# Patient Record
Sex: MALE | Race: WHITE | NOT HISPANIC OR LATINO | URBAN - METROPOLITAN AREA
[De-identification: names, ages, dates, MRNs, and addresses within clinical notes are randomized per-mention and may not be internally consistent; named-entity substitution may affect disease eponyms.]

---

## 2021-03-24 LAB
CHOLESTEROL (EXTERNAL): 173 MG/DL (ref 100–200)
GLUCOSE (EXTERNAL): 88 MG/DL (ref 70–100)
HDLC SERPL-MCNC: 40 MG/DL (ref 40–80)
LDL CHOLESTEROL (EXTERNAL): 100 MG/DL (ref 0–129)
POTASSIUM (EXTERNAL): 4.3 MEQ/L (ref 3.5–5.3)
TRIGLYCERIDES (EXTERNAL): 165 MG/DL (ref 50–150)
TSH SERPL-ACNC: 1.36 UIU/ML (ref 0.4–5)

## 2021-09-09 LAB
CREATININE (EXTERNAL): 1.02 MG/DL (ref 0.8–1.3)
GFR ESTIMATED (EXTERNAL): 75 ML/MIN/1.73M2
GFR ESTIMATED (IF AFRICAN AMERICAN) (EXTERNAL): >90 ML/MIN/1.73M2
HEP C HIM: NORMAL

## 2022-11-28 ENCOUNTER — MEDICAL CORRESPONDENCE (OUTPATIENT)
Dept: HEALTH INFORMATION MANAGEMENT | Facility: CLINIC | Age: 57
End: 2022-11-28

## 2022-11-28 ENCOUNTER — TELEPHONE (OUTPATIENT)
Dept: OTOLARYNGOLOGY | Facility: CLINIC | Age: 57
End: 2022-11-28

## 2022-11-28 NOTE — TELEPHONE ENCOUNTER
Records Requested   November 28, 2022 2:52 PM   Bruna    [facility]    Outcome Called Hachita to push image to [facility] PACS  11/2/22- CT Fusion Sinus   11/1/19- CT Fusion Sinus     November 28, 2022 4:07 PM - Received image in pacs and attached it to patient- Bruna

## 2022-11-29 ENCOUNTER — TRANSCRIBE ORDERS (OUTPATIENT)
Dept: OTHER | Age: 57
End: 2022-11-29

## 2022-11-29 DIAGNOSIS — J32.4 CHRONIC PANSINUSITIS: Primary | ICD-10-CM

## 2022-11-29 DIAGNOSIS — J33.9 NASAL POLYPOSIS: ICD-10-CM

## 2022-11-30 ENCOUNTER — TELEPHONE (OUTPATIENT)
Dept: OTOLARYNGOLOGY | Facility: CLINIC | Age: 57
End: 2022-11-30

## 2022-11-30 NOTE — TELEPHONE ENCOUNTER
[facility] Call Center    Phone Message    May a detailed message be left on voicemail: no     Reason for Call: Other: pt calling to schedule visit with dr salcedo and at the same time schedule surgery so he can do in one trip if possible please call pt to discuss did not want to schedule appt without diiscussing surgery option also. Pt stated he had talked to dr briseno    Action Taken: Other: routing to [facility] csc    Travel Screening: Not Applicable

## 2022-12-01 DIAGNOSIS — J32.4 CHRONIC PANSINUSITIS: Primary | ICD-10-CM

## 2022-12-01 NOTE — TELEPHONE ENCOUNTER
FUTURE VISIT INFORMATION      FUTURE VISIT INFORMATION:    Date: 1/11/23    Time: 3pm    Location: [facility] [place]  REFERRAL INFORMATION:    Referring provider:  Antonia AHN     Referring providers clinic:      Reason for visit/diagnosis  pt favors intervention such as: modified Lothrop to maximize access for topical therapies up into his frontal sinus, Chronic pansinusitis [J32.4] Nasal polyposis [J33.9]  Referred by: Antonia AHN @ , recs faxed - sched per pt    RECORDS REQUESTED FROM:       Clinic name Comments Records Status Imaging Status    11/2/22- CT Sinus   11/1/19- CT Sinus      11/28/22- referral from Antonia AHN   11/9/22 through 7/23/18 note from Darnell Alexander MD Scanned in [facility] & care everywhere

## 2022-12-02 ENCOUNTER — TELEPHONE (OUTPATIENT)
Dept: OTOLARYNGOLOGY | Facility: CLINIC | Age: 57
End: 2022-12-02

## 2022-12-02 NOTE — TELEPHONE ENCOUNTER
Writer called and LM for patient. Need to discuss POC and surgery with patient.     Left direct call back number.     Lillian Bond RN on 12/2/2022 at 1:17 PM

## 2022-12-02 NOTE — TELEPHONE ENCOUNTER
Called patient to schedule surgery with Dr. Waters    Date of Surgery: 1/17    Location of surgery: [facility]    Pre-Op H&P: PCP Darnell Scott    Pre/Post Imaging:  Not Applicable    Discussed COVID-19 Testing: Not Applicable    Post-Op Appt Date: 1 week    Surgery Packet Mailed: 12/2      Additional comments: [facility] on 12/2/2022 at 11:07 AM

## 2022-12-23 ENCOUNTER — TRANSFERRED RECORDS (OUTPATIENT)
Dept: MULTI SPECIALTY CLINIC | Facility: CLINIC | Age: 57
End: 2022-12-23

## 2022-12-23 LAB
ALT SERPL-CCNC: 32 U/L (ref 0–55)
AST SERPL-CCNC: 27 U/L (ref 5–45)

## 2023-01-11 ENCOUNTER — PRE VISIT (OUTPATIENT)
Dept: OTOLARYNGOLOGY | Facility: CLINIC | Age: 58
End: 2023-01-11

## 2023-01-13 PROBLEM — D49.4 BLADDER TUMOR: Status: ACTIVE | Noted: 2019-11-06

## 2023-01-13 RX ORDER — RABEPRAZOLE SODIUM 20 MG/1
20 TABLET, DELAYED RELEASE ORAL
COMMUNITY

## 2023-01-13 RX ORDER — FLUTICASONE FUROATE, UMECLIDINIUM BROMIDE AND VILANTEROL TRIFENATATE 200; 62.5; 25 UG/1; UG/1; UG/1
1 POWDER RESPIRATORY (INHALATION)
COMMUNITY
Start: 2022-08-03 | End: 2023-08-08

## 2023-01-13 RX ORDER — DUTASTERIDE 0.5 MG/1
0.5 CAPSULE, LIQUID FILLED ORAL
COMMUNITY
Start: 2022-09-09

## 2023-01-13 RX ORDER — BUPRENORPHINE HYDROCHLORIDE AND NALOXONE HYDROCHLORIDE DIHYDRATE 2; .5 MG/1; MG/1
TABLET SUBLINGUAL
COMMUNITY
Start: 2022-09-12

## 2023-01-13 RX ORDER — BUSPIRONE HYDROCHLORIDE 10 MG/1
10 TABLET ORAL
COMMUNITY
Start: 2022-06-27

## 2023-01-13 RX ORDER — OMALIZUMAB 150 MG/ML
300 INJECTION, SOLUTION SUBCUTANEOUS
COMMUNITY
Start: 2022-02-25

## 2023-01-13 RX ORDER — ALBUTEROL SULFATE 90 UG/1
2 AEROSOL, METERED RESPIRATORY (INHALATION)
COMMUNITY
Start: 2022-06-13

## 2023-01-16 ENCOUNTER — OFFICE VISIT (OUTPATIENT)
Dept: OTOLARYNGOLOGY | Facility: CLINIC | Age: 58
End: 2023-01-16
Payer: COMMERCIAL

## 2023-01-16 ENCOUNTER — ANESTHESIA EVENT (OUTPATIENT)
Dept: SURGERY | Facility: CLINIC | Age: 58
End: 2023-01-16
Payer: COMMERCIAL

## 2023-01-16 VITALS
DIASTOLIC BLOOD PRESSURE: 74 MMHG | HEART RATE: 98 BPM | BODY MASS INDEX: 30.67 KG/M2 | TEMPERATURE: 97.5 F | SYSTOLIC BLOOD PRESSURE: 124 MMHG | WEIGHT: 214.2 LBS | OXYGEN SATURATION: 98 % | HEIGHT: 70 IN

## 2023-01-16 DIAGNOSIS — J33.9 NASAL POLYPOSIS: ICD-10-CM

## 2023-01-16 DIAGNOSIS — L40.50 PSORIATIC ARTHRITIS (H): ICD-10-CM

## 2023-01-16 DIAGNOSIS — J32.4 CHRONIC PANSINUSITIS: ICD-10-CM

## 2023-01-16 DIAGNOSIS — J45.40 MODERATE PERSISTENT REACTIVE AIRWAY DISEASE WITHOUT COMPLICATION: Primary | ICD-10-CM

## 2023-01-16 PROCEDURE — 31231 NASAL ENDOSCOPY DX: CPT | Performed by: OTOLARYNGOLOGY

## 2023-01-16 PROCEDURE — 99204 OFFICE O/P NEW MOD 45 MIN: CPT | Mod: 25 | Performed by: OTOLARYNGOLOGY

## 2023-01-16 RX ORDER — BUDESONIDE 0.5 MG/2ML
INHALANT ORAL
Qty: 2 ML | Refills: 4 | Status: SHIPPED | OUTPATIENT
Start: 2023-01-16

## 2023-01-16 ASSESSMENT — PAIN SCALES - GENERAL: PAINLEVEL: MODERATE PAIN (4)

## 2023-01-16 NOTE — LETTER
"1/16/2023       RE: Elpidio De La Fuente  [address] [address]     Dear Colleague,    Thank you for referring your patient, Elpidio De La Fuente, to the [facility] [place] [facility]. Please see a copy of my visit note below.                       [facility]                   New Patient Visit      Encounter date: January 16, 2023    Referring Provider: Darnell Lincoln MD    Chief Complaint: chronic sinusitis with nasal polyps; surgical consult    History of Present Illness: Elpidio De La Fuente is a pleasant 57-year-old gentleman who I am seeing at the request of Dr. Darnell Lincoln for chronic sinusitis with nasal polyps.  He has a history of multiple endoscopic sinus surgeries for chronic sinus disease.  He does well after surgery but slowly over 2 to 3 years his symptoms recur.  His symptoms primarily consist of mental fogginess, nasal congestion, frontal pressure, thick nasal discharge.  He also has hyposmia, which improves with high-dose prednisone.  He was referred to me for consideration for revision surgery, including endoscopic modified Lothrop.    Sino-Nasal Outcome Test (SNOT - 22)  [facility]      Review of systems: A 14-point review of systems has been conducted and was negative for any notable symptoms, except as dictated in the history of present illness.     Past Medical History:   Diagnosis Date     Anxiety      History of kidney cancer      Sleep apnea      Uncomplicated asthma       -psoriatic arthritis    No past surgical history on file.     No family history on file.     Social History     Socioeconomic History     Marital status: Single   Substance and Sexual Activity     Alcohol use: Not Currently     Drug use: Never        Physical Exam:  Vital signs: /74 (BP Location: Left arm, Patient Position: Sitting, Cuff Size: Adult Large)   Pulse 98   Temp 97.5  F (36.4  C) (Temporal)   Ht 1.778 m (5' 10\")  "  Wt 97.2 kg (214 lb 3.2 oz)   SpO2 98%   BMI 30.73 kg/m     General Appearance: No acute distress, appropriate demeanor, conversant  Eyes: moist conjunctivae; EOMI; pupils symmetric; visual acuity grossly intact; no proptosis  Head: normocephalic; overall symmetric appearance without deformity  Face: overall symmetric without deformity; HB I/VI  Ears: Normal appearance of external ear;   Nose: No external deformity; see nasal endoscopy  Oral Cavity/oropharynx: Normal appearance of mucosa; tongue midline; no mass or lesions; oropharynx without obvious mucosal abnormality  Neck: no palpable lymphadenopathy; thyroid without palpable nodules  Lungs: symmetric chest rise; no wheezing  CV: Good distal perfusion; normal heart rate  Extremities: No deformity  Neurologic Exam: Cranial nerves II-XII are grossly intact; no focal deficit      Procedure Note  Procedure performed: Rigid nasal endoscopy  Indication: To evaluate for sinonasal pathology not visualized on routine anterior rhinoscopy  Anesthesia: 4% topical lidocaine with 0.05% oxymetazoline  Description of procedure: A 30 degree, 3 mm rigid endoscope was inserted into bilateral nasal cavities and the nasal valve, nasal cavity, middle meatus, sphenoethmoid recess, and nasopharynx were thoroughly evaluated for evidence of obstruction, edema, purulence, polyps and/or mass/lesion.     Lando-Luis Endoscopic Scoring System  Endoscopic observation Right Left   Polyps in middle meatus (0 = absent, 1 = restricted to middle meatus, 2 = Beyond middle meatus) 2 2   Discharge (0 = absent, 1 = thin and clear, 2 = thick, purulent) 1 1   Edema (0 = absent, 1 = mild-moderate, 2 = moderate-severe) 1 1   Crusting (0 = absent, 1 = mild-moderate, 2 = moderate-severe) 0 0   Scarring (0= absent, 1 = mild-moderate, 2 = moderate-severe) 0 0   Total 4 4     Findings  RT: Extensive postsurgical changes; evidence of modified endoscopic medial maxillectomy; polyp extending into the nasal  cavity from the olfactory cleft  LT: Extensive postsurgical changes; grade 2 nasal polyps; inferior meatal window present; polypoid edema and thickened mucin of the left maxillary sinus    Nasopharynx relatively clear    The patient tolerated the procedure well without complication.     Laboratory Review:  n/a    Imaging Review:  I personally reviewed images from the CT sinus from November 2, 2022:  There is pansinus mucosal thickening, most severe in the bilateral frontal sinuses. There are extensive new osteogenesis of many of the sinuses, especially the sphenoid sinus.  There are extensive surgical changes, including: Near complete resection of the right middle turbinate flush with the skull base, right endoscopic medial maxillectomy changes, left inferior meatal window, left partial resection of middle turbinate, septal perforation with button in place, extensive new osteogenesis of the high nasofrontal beak and high bony nasal septum.    Pathology Review:  n/a    Assessment/Medical Decision Making/Plan:  CRSwNP with comorbid asthma  History of psoriatic arthritis  Hyposmia and nasal congestion secondary to CRSwNP  Atypical facial pain    I reviewed CT imaging and endoscopy with Elpidio at length.  He has severe chronic sinusitis with nasal polyps and associated symptoms despite many trials of appropriate medical therapy.  We discussed options for revision surgery, including modified Lothrop versus conventional revision surgery.  We discussed merits, risks, benefits associated with both approaches.  We discussed risks associated with endoscopic modified Lothrop, primarily anosmia, scarring and also need for frequent postoperative debridements.  We also discussed conventional revision sinus surgery with plans to switch from Xolair to Dupixent, and he seemed very amenable to this.  Together, we decided that the most beneficial path would be to perform a conventional revision sinus surgery and then proceed with  switching to Dupixent.  He has already had discussed switching to Dupixent with both his rheumatologist who prescribes his Humira and his pulmonologist, who prescribes his Xolair.  Both appear to be on board with this and I would favor this approach as well.      Plan to move forward with full revision sinus surgery tomorrow.  We discussed postoperative expectations, which she is fairly familiar with.  I will plan to see him tomorrow morning.      Roman Waters MD    [facility]  Center for Skull Base and Pituitary Surgery  [facility]  Department of Otolaryngology - Head & Neck Surgery          Again, thank you for allowing me to participate in the care of your patient.      Sincerely,    Roman Waters MD

## 2023-01-16 NOTE — LETTER
Date:January 17, 2023      Patient was self referred, no letter generated. Do not send.        [facility] Health Information

## 2023-01-16 NOTE — PROGRESS NOTES
"                   [facility]                   New Patient Visit      Encounter date: January 16, 2023    Referring Provider: Darnell Lincoln MD    Chief Complaint: chronic sinusitis with nasal polyps; surgical consult    History of Present Illness: Elpidio De La Fuente is a pleasant 57-year-old gentleman who I am seeing at the request of Dr. Darnell Lincoln for chronic sinusitis with nasal polyps.  He has a history of multiple endoscopic sinus surgeries for chronic sinus disease.  He does well after surgery but slowly over 2 to 3 years his symptoms recur.  His symptoms primarily consist of mental fogginess, nasal congestion, frontal pressure, thick nasal discharge.  He also has hyposmia, which improves with high-dose prednisone.  He was referred to me for consideration for revision surgery, including endoscopic modified Lothrop.    Sino-Nasal Outcome Test (SNOT - 22)  [facility]      Review of systems: A 14-point review of systems has been conducted and was negative for any notable symptoms, except as dictated in the history of present illness.     Past Medical History:   Diagnosis Date     Anxiety      History of kidney cancer      Sleep apnea      Uncomplicated asthma       -psoriatic arthritis    No past surgical history on file.     No family history on file.     Social History     Socioeconomic History     Marital status: Single   Substance and Sexual Activity     Alcohol use: Not Currently     Drug use: Never        Physical Exam:  Vital signs: /74 (BP Location: Left arm, Patient Position: Sitting, Cuff Size: Adult Large)   Pulse 98   Temp 97.5  F (36.4  C) (Temporal)   Ht 1.778 m (5' 10\")   Wt 97.2 kg (214 lb 3.2 oz)   SpO2 98%   BMI 30.73 kg/m     General Appearance: No acute distress, appropriate demeanor, conversant  Eyes: moist conjunctivae; EOMI; pupils symmetric; visual acuity grossly intact; no proptosis  Head: normocephalic; overall symmetric appearance without deformity  Face: overall " symmetric without deformity; HB I/VI  Ears: Normal appearance of external ear;   Nose: No external deformity; see nasal endoscopy  Oral Cavity/oropharynx: Normal appearance of mucosa; tongue midline; no mass or lesions; oropharynx without obvious mucosal abnormality  Neck: no palpable lymphadenopathy; thyroid without palpable nodules  Lungs: symmetric chest rise; no wheezing  CV: Good distal perfusion; normal heart rate  Extremities: No deformity  Neurologic Exam: Cranial nerves II-XII are grossly intact; no focal deficit      Procedure Note  Procedure performed: Rigid nasal endoscopy  Indication: To evaluate for sinonasal pathology not visualized on routine anterior rhinoscopy  Anesthesia: 4% topical lidocaine with 0.05% oxymetazoline  Description of procedure: A 30 degree, 3 mm rigid endoscope was inserted into bilateral nasal cavities and the nasal valve, nasal cavity, middle meatus, sphenoethmoid recess, and nasopharynx were thoroughly evaluated for evidence of obstruction, edema, purulence, polyps and/or mass/lesion.     Aislinn-Luis Endoscopic Scoring System  Endoscopic observation Right Left   Polyps in middle meatus (0 = absent, 1 = restricted to middle meatus, 2 = Beyond middle meatus) 2 2   Discharge (0 = absent, 1 = thin and clear, 2 = thick, purulent) 1 1   Edema (0 = absent, 1 = mild-moderate, 2 = moderate-severe) 1 1   Crusting (0 = absent, 1 = mild-moderate, 2 = moderate-severe) 0 0   Scarring (0= absent, 1 = mild-moderate, 2 = moderate-severe) 0 0   Total 4 4     Findings  RT: Extensive postsurgical changes; evidence of modified endoscopic medial maxillectomy; polyp extending into the nasal cavity from the olfactory cleft  LT: Extensive postsurgical changes; grade 2 nasal polyps; inferior meatal window present; polypoid edema and thickened mucin of the left maxillary sinus    Nasopharynx relatively clear    The patient tolerated the procedure well without complication.     Laboratory  Review:  n/a    Imaging Review:  I personally reviewed images from the CT sinus from November 2, 2022:  There is pansinus mucosal thickening, most severe in the bilateral frontal sinuses. There are extensive new osteogenesis of many of the sinuses, especially the sphenoid sinus.  There are extensive surgical changes, including: Near complete resection of the right middle turbinate flush with the skull base, right endoscopic medial maxillectomy changes, left inferior meatal window, left partial resection of middle turbinate, septal perforation with button in place, extensive new osteogenesis of the high nasofrontal beak and high bony nasal septum.    Pathology Review:  n/a    Assessment/Medical Decision Making/Plan:  CRSwNP with comorbid asthma  History of psoriatic arthritis  Hyposmia and nasal congestion secondary to CRSwNP  Atypical facial pain    I reviewed CT imaging and endoscopy with Elpidio at length.  He has severe chronic sinusitis with nasal polyps and associated symptoms despite many trials of appropriate medical therapy.  We discussed options for revision surgery, including modified Lothrop versus conventional revision surgery.  We discussed merits, risks, benefits associated with both approaches.  We discussed risks associated with endoscopic modified Lothrop, primarily anosmia, scarring and also need for frequent postoperative debridements.  We also discussed conventional revision sinus surgery with plans to switch from Xolair to Dupixent, and he seemed very amenable to this.  Together, we decided that the most beneficial path would be to perform a conventional revision sinus surgery and then proceed with switching to Dupixent.  He has already had discussed switching to Dupixent with both his rheumatologist who prescribes his Humira and his pulmonologist, who prescribes his Xolair.  Both appear to be on board with this and I would favor this approach as well.      Plan to move forward with full revision  sinus surgery tomorrow.  We discussed postoperative expectations, which she is fairly familiar with.  I will plan to see him tomorrow morning.      Romna Waters MD    [facility]  Center for Skull Base and Pituitary Surgery  [facility]  Department of Otolaryngology - Head & Neck Surgery

## 2023-01-17 ENCOUNTER — ANESTHESIA (OUTPATIENT)
Dept: SURGERY | Facility: CLINIC | Age: 58
End: 2023-01-17
Payer: COMMERCIAL

## 2023-01-17 ENCOUNTER — HOSPITAL ENCOUNTER (OUTPATIENT)
Facility: CLINIC | Age: 58
Discharge: HOME | End: 2023-01-17
Attending: OTOLARYNGOLOGY | Admitting: OTOLARYNGOLOGY
Payer: COMMERCIAL

## 2023-01-17 VITALS
RESPIRATION RATE: 14 BRPM | OXYGEN SATURATION: 92 % | SYSTOLIC BLOOD PRESSURE: 144 MMHG | DIASTOLIC BLOOD PRESSURE: 91 MMHG | WEIGHT: 214.29 LBS | BODY MASS INDEX: 30.68 KG/M2 | HEIGHT: 70 IN | HEART RATE: 86 BPM | TEMPERATURE: 98.5 F

## 2023-01-17 DIAGNOSIS — J33.9 SINUSITIS WITH NASAL POLYPS: Primary | ICD-10-CM

## 2023-01-17 DIAGNOSIS — J32.9 SINUSITIS WITH NASAL POLYPS: Primary | ICD-10-CM

## 2023-01-17 LAB — GLUCOSE BLDC GLUCOMTR-MCNC: 98 MG/DL (ref 70–99)

## 2023-01-17 PROCEDURE — 61782 SCAN PROC CRANIAL EXTRA: CPT | Mod: GC | Performed by: OTOLARYNGOLOGY

## 2023-01-17 PROCEDURE — 370N000017 HC ANESTHESIA TECHNICAL FEE, PER MIN: Performed by: OTOLARYNGOLOGY

## 2023-01-17 PROCEDURE — 250N000009 HC RX 250: Performed by: ANESTHESIOLOGY

## 2023-01-17 PROCEDURE — 360N000076 HC SURGERY LEVEL 3, PER MIN: Performed by: OTOLARYNGOLOGY

## 2023-01-17 PROCEDURE — 87070 CULTURE OTHR SPECIMN AEROBIC: CPT | Performed by: OTOLARYNGOLOGY

## 2023-01-17 PROCEDURE — 87075 CULTR BACTERIA EXCEPT BLOOD: CPT | Performed by: OTOLARYNGOLOGY

## 2023-01-17 PROCEDURE — 250N000025 HC SEVOFLURANE, PER MIN: Performed by: OTOLARYNGOLOGY

## 2023-01-17 PROCEDURE — 272N000001 HC OR GENERAL SUPPLY STERILE: Performed by: OTOLARYNGOLOGY

## 2023-01-17 PROCEDURE — 250N000013 HC RX MED GY IP 250 OP 250 PS 637: Performed by: STUDENT IN AN ORGANIZED HEALTH CARE EDUCATION/TRAINING PROGRAM

## 2023-01-17 PROCEDURE — 710N000010 HC RECOVERY PHASE 1, LEVEL 2, PER MIN: Performed by: OTOLARYNGOLOGY

## 2023-01-17 PROCEDURE — 258N000003 HC RX IP 258 OP 636: Performed by: ANESTHESIOLOGY

## 2023-01-17 PROCEDURE — 710N000012 HC RECOVERY PHASE 2, PER MINUTE: Performed by: OTOLARYNGOLOGY

## 2023-01-17 PROCEDURE — 31276 NSL/SINS NDSC FRNT TISS RMVL: CPT | Mod: 50 | Performed by: OTOLARYNGOLOGY

## 2023-01-17 PROCEDURE — 250N000011 HC RX IP 250 OP 636: Performed by: ANESTHESIOLOGY

## 2023-01-17 PROCEDURE — 88312 SPECIAL STAINS GROUP 1: CPT | Mod: 26 | Performed by: STUDENT IN AN ORGANIZED HEALTH CARE EDUCATION/TRAINING PROGRAM

## 2023-01-17 PROCEDURE — 88305 TISSUE EXAM BY PATHOLOGIST: CPT | Mod: 26 | Performed by: STUDENT IN AN ORGANIZED HEALTH CARE EDUCATION/TRAINING PROGRAM

## 2023-01-17 PROCEDURE — 30130 EXCISE INFERIOR TURBINATE: CPT | Mod: 50 | Performed by: OTOLARYNGOLOGY

## 2023-01-17 PROCEDURE — 82962 GLUCOSE BLOOD TEST: CPT

## 2023-01-17 PROCEDURE — 31257 NSL/SINS NDSC TOT W/SPHENDT: CPT | Mod: 50 | Performed by: OTOLARYNGOLOGY

## 2023-01-17 PROCEDURE — 87077 CULTURE AEROBIC IDENTIFY: CPT | Mod: 59 | Performed by: OTOLARYNGOLOGY

## 2023-01-17 PROCEDURE — 999N000141 HC STATISTIC PRE-PROCEDURE NURSING ASSESSMENT: Performed by: OTOLARYNGOLOGY

## 2023-01-17 PROCEDURE — 250N000009 HC RX 250: Performed by: OTOLARYNGOLOGY

## 2023-01-17 PROCEDURE — 88305 TISSUE EXAM BY PATHOLOGIST: CPT | Mod: TC | Performed by: OTOLARYNGOLOGY

## 2023-01-17 PROCEDURE — 87176 TISSUE HOMOGENIZATION CULTR: CPT | Performed by: OTOLARYNGOLOGY

## 2023-01-17 PROCEDURE — C2625 STENT, NON-COR, TEM W/DEL SY: HCPCS | Performed by: OTOLARYNGOLOGY

## 2023-01-17 PROCEDURE — 31267 ENDOSCOPY MAXILLARY SINUS: CPT | Mod: 50 | Performed by: OTOLARYNGOLOGY

## 2023-01-17 PROCEDURE — 250N000011 HC RX IP 250 OP 636: Performed by: OTOLARYNGOLOGY

## 2023-01-17 DEVICE — IMP SINUS PROPEL MINI MOMETASONE FUORATE 370MCCG 16MM 60011: Type: IMPLANTABLE DEVICE | Site: NOSE | Status: FUNCTIONAL

## 2023-01-17 RX ORDER — ECHINACEA PURPUREA EXTRACT 125 MG
2 TABLET ORAL 3 TIMES DAILY
Qty: 100 ML | Refills: 0 | Status: SHIPPED | OUTPATIENT
Start: 2023-01-17

## 2023-01-17 RX ORDER — LIDOCAINE HYDROCHLORIDE AND EPINEPHRINE 10; 10 MG/ML; UG/ML
INJECTION, SOLUTION INFILTRATION; PERINEURAL PRN
Status: DISCONTINUED | OUTPATIENT
Start: 2023-01-17 | End: 2023-01-17 | Stop reason: HOSPADM

## 2023-01-17 RX ORDER — FENTANYL CITRATE 50 UG/ML
INJECTION, SOLUTION INTRAMUSCULAR; INTRAVENOUS PRN
Status: DISCONTINUED | OUTPATIENT
Start: 2023-01-17 | End: 2023-01-17

## 2023-01-17 RX ORDER — ONDANSETRON 2 MG/ML
4 INJECTION INTRAMUSCULAR; INTRAVENOUS EVERY 30 MIN PRN
Status: DISCONTINUED | OUTPATIENT
Start: 2023-01-17 | End: 2023-01-17 | Stop reason: HOSPADM

## 2023-01-17 RX ORDER — DEXAMETHASONE SODIUM PHOSPHATE 4 MG/ML
10 INJECTION, SOLUTION INTRA-ARTICULAR; INTRALESIONAL; INTRAMUSCULAR; INTRAVENOUS; SOFT TISSUE ONCE
Status: DISCONTINUED | OUTPATIENT
Start: 2023-01-17 | End: 2023-01-17 | Stop reason: HOSPADM

## 2023-01-17 RX ORDER — LABETALOL HYDROCHLORIDE 5 MG/ML
10 INJECTION, SOLUTION INTRAVENOUS
Status: DISCONTINUED | OUTPATIENT
Start: 2023-01-17 | End: 2023-01-17 | Stop reason: HOSPADM

## 2023-01-17 RX ORDER — ACETAMINOPHEN 325 MG/1
975 TABLET ORAL ONCE
Status: COMPLETED | OUTPATIENT
Start: 2023-01-17 | End: 2023-01-17

## 2023-01-17 RX ORDER — ONDANSETRON 4 MG/1
4 TABLET, ORALLY DISINTEGRATING ORAL EVERY 30 MIN PRN
Status: DISCONTINUED | OUTPATIENT
Start: 2023-01-17 | End: 2023-01-17 | Stop reason: HOSPADM

## 2023-01-17 RX ORDER — KETAMINE HYDROCHLORIDE 10 MG/ML
INJECTION INTRAMUSCULAR; INTRAVENOUS PRN
Status: DISCONTINUED | OUTPATIENT
Start: 2023-01-17 | End: 2023-01-17

## 2023-01-17 RX ORDER — LABETALOL 20 MG/4 ML (5 MG/ML) INTRAVENOUS SYRINGE
PRN
Status: DISCONTINUED | OUTPATIENT
Start: 2023-01-17 | End: 2023-01-17

## 2023-01-17 RX ORDER — FENTANYL CITRATE 50 UG/ML
25 INJECTION, SOLUTION INTRAMUSCULAR; INTRAVENOUS EVERY 5 MIN PRN
Status: DISCONTINUED | OUTPATIENT
Start: 2023-01-17 | End: 2023-01-17 | Stop reason: HOSPADM

## 2023-01-17 RX ORDER — HYDRALAZINE HYDROCHLORIDE 20 MG/ML
2.5-5 INJECTION INTRAMUSCULAR; INTRAVENOUS EVERY 10 MIN PRN
Status: DISCONTINUED | OUTPATIENT
Start: 2023-01-17 | End: 2023-01-17 | Stop reason: HOSPADM

## 2023-01-17 RX ORDER — EPINEPHRINE 1 MG/ML
INJECTION INTRAMUSCULAR; INTRAVENOUS; SUBCUTANEOUS PRN
Status: DISCONTINUED | OUTPATIENT
Start: 2023-01-17 | End: 2023-01-17 | Stop reason: HOSPADM

## 2023-01-17 RX ORDER — PROPOFOL 10 MG/ML
INJECTION, EMULSION INTRAVENOUS PRN
Status: DISCONTINUED | OUTPATIENT
Start: 2023-01-17 | End: 2023-01-17

## 2023-01-17 RX ORDER — FENTANYL CITRATE 50 UG/ML
50 INJECTION, SOLUTION INTRAMUSCULAR; INTRAVENOUS EVERY 5 MIN PRN
Status: DISCONTINUED | OUTPATIENT
Start: 2023-01-17 | End: 2023-01-17 | Stop reason: HOSPADM

## 2023-01-17 RX ORDER — SODIUM CHLORIDE, SODIUM LACTATE, POTASSIUM CHLORIDE, CALCIUM CHLORIDE 600; 310; 30; 20 MG/100ML; MG/100ML; MG/100ML; MG/100ML
INJECTION, SOLUTION INTRAVENOUS CONTINUOUS
Status: DISCONTINUED | OUTPATIENT
Start: 2023-01-17 | End: 2023-01-17 | Stop reason: HOSPADM

## 2023-01-17 RX ORDER — DEXAMETHASONE SODIUM PHOSPHATE 4 MG/ML
INJECTION, SOLUTION INTRA-ARTICULAR; INTRALESIONAL; INTRAMUSCULAR; INTRAVENOUS; SOFT TISSUE PRN
Status: DISCONTINUED | OUTPATIENT
Start: 2023-01-17 | End: 2023-01-17

## 2023-01-17 RX ORDER — PREDNISONE 20 MG/1
TABLET ORAL
Qty: 19 TABLET | Refills: 0 | Status: SHIPPED | OUTPATIENT
Start: 2023-01-17 | End: 2023-01-31

## 2023-01-17 RX ORDER — CEFAZOLIN SODIUM/WATER 2 G/20 ML
2 SYRINGE (ML) INTRAVENOUS SEE ADMIN INSTRUCTIONS
Status: DISCONTINUED | OUTPATIENT
Start: 2023-01-17 | End: 2023-01-17 | Stop reason: HOSPADM

## 2023-01-17 RX ORDER — GLYCOPYRROLATE 0.2 MG/ML
INJECTION, SOLUTION INTRAMUSCULAR; INTRAVENOUS PRN
Status: DISCONTINUED | OUTPATIENT
Start: 2023-01-17 | End: 2023-01-17

## 2023-01-17 RX ORDER — HYDROMORPHONE HYDROCHLORIDE 4 MG/ML
INJECTION, SOLUTION INTRAMUSCULAR; INTRAVENOUS; SUBCUTANEOUS PRN
Status: DISCONTINUED | OUTPATIENT
Start: 2023-01-17 | End: 2023-01-17

## 2023-01-17 RX ORDER — SODIUM CHLORIDE, SODIUM LACTATE, POTASSIUM CHLORIDE, CALCIUM CHLORIDE 600; 310; 30; 20 MG/100ML; MG/100ML; MG/100ML; MG/100ML
INJECTION, SOLUTION INTRAVENOUS CONTINUOUS PRN
Status: DISCONTINUED | OUTPATIENT
Start: 2023-01-17 | End: 2023-01-17

## 2023-01-17 RX ORDER — OXYCODONE HYDROCHLORIDE 5 MG/1
5 TABLET ORAL EVERY 6 HOURS PRN
Qty: 15 TABLET | Refills: 0 | Status: SHIPPED | OUTPATIENT
Start: 2023-01-17 | End: 2023-01-24

## 2023-01-17 RX ORDER — ONDANSETRON 8 MG/1
8 TABLET, ORALLY DISINTEGRATING ORAL EVERY 8 HOURS PRN
Qty: 12 TABLET | Refills: 0 | Status: SHIPPED | OUTPATIENT
Start: 2023-01-17

## 2023-01-17 RX ORDER — CEFAZOLIN SODIUM/WATER 2 G/20 ML
2 SYRINGE (ML) INTRAVENOUS
Status: COMPLETED | OUTPATIENT
Start: 2023-01-17 | End: 2023-01-17

## 2023-01-17 RX ORDER — ONDANSETRON 2 MG/ML
INJECTION INTRAMUSCULAR; INTRAVENOUS PRN
Status: DISCONTINUED | OUTPATIENT
Start: 2023-01-17 | End: 2023-01-17

## 2023-01-17 RX ORDER — LIDOCAINE HYDROCHLORIDE 20 MG/ML
INJECTION, SOLUTION INFILTRATION; PERINEURAL PRN
Status: DISCONTINUED | OUTPATIENT
Start: 2023-01-17 | End: 2023-01-17

## 2023-01-17 RX ADMIN — Medication 25 MG: at 08:45

## 2023-01-17 RX ADMIN — SODIUM CHLORIDE, POTASSIUM CHLORIDE, SODIUM LACTATE AND CALCIUM CHLORIDE: 600; 310; 30; 20 INJECTION, SOLUTION INTRAVENOUS at 11:52

## 2023-01-17 RX ADMIN — PROPOFOL 40 MG: 10 INJECTION, EMULSION INTRAVENOUS at 11:51

## 2023-01-17 RX ADMIN — LIDOCAINE HYDROCHLORIDE 100 MG: 20 INJECTION, SOLUTION INFILTRATION; PERINEURAL at 08:13

## 2023-01-17 RX ADMIN — Medication 5 MG: at 11:18

## 2023-01-17 RX ADMIN — PROPOFOL 50 MG: 10 INJECTION, EMULSION INTRAVENOUS at 08:17

## 2023-01-17 RX ADMIN — ONDANSETRON 4 MG: 2 INJECTION INTRAMUSCULAR; INTRAVENOUS at 11:26

## 2023-01-17 RX ADMIN — SODIUM CHLORIDE, POTASSIUM CHLORIDE, SODIUM LACTATE AND CALCIUM CHLORIDE: 600; 310; 30; 20 INJECTION, SOLUTION INTRAVENOUS at 08:07

## 2023-01-17 RX ADMIN — Medication 10 MG: at 09:29

## 2023-01-17 RX ADMIN — Medication 10 MG: at 09:43

## 2023-01-17 RX ADMIN — FENTANYL CITRATE 100 MCG: 50 INJECTION, SOLUTION INTRAMUSCULAR; INTRAVENOUS at 08:13

## 2023-01-17 RX ADMIN — PROPOFOL 30 MG: 10 INJECTION, EMULSION INTRAVENOUS at 11:22

## 2023-01-17 RX ADMIN — PROPOFOL 150 MG: 10 INJECTION, EMULSION INTRAVENOUS at 08:13

## 2023-01-17 RX ADMIN — PROPOFOL 50 MG: 10 INJECTION, EMULSION INTRAVENOUS at 08:15

## 2023-01-17 RX ADMIN — LABETALOL 20 MG/4 ML (5 MG/ML) INTRAVENOUS SYRINGE 5 MG: at 09:49

## 2023-01-17 RX ADMIN — ACETAMINOPHEN 975 MG: 325 TABLET, FILM COATED ORAL at 13:01

## 2023-01-17 RX ADMIN — Medication 50 MG: at 08:14

## 2023-01-17 RX ADMIN — Medication 20 MG: at 09:27

## 2023-01-17 RX ADMIN — FENTANYL CITRATE 50 MCG: 50 INJECTION, SOLUTION INTRAMUSCULAR; INTRAVENOUS at 09:59

## 2023-01-17 RX ADMIN — GLYCOPYRROLATE 0.2 MG: 0.2 INJECTION, SOLUTION INTRAMUSCULAR; INTRAVENOUS at 08:56

## 2023-01-17 RX ADMIN — HYDROMORPHONE HYDROCHLORIDE 0.5 MG: 4 INJECTION, SOLUTION INTRAMUSCULAR; INTRAVENOUS; SUBCUTANEOUS at 09:39

## 2023-01-17 RX ADMIN — Medication 20 MG: at 08:45

## 2023-01-17 RX ADMIN — LABETALOL 20 MG/4 ML (5 MG/ML) INTRAVENOUS SYRINGE 5 MG: at 11:38

## 2023-01-17 RX ADMIN — DEXAMETHASONE SODIUM PHOSPHATE 10 MG: 4 INJECTION, SOLUTION INTRA-ARTICULAR; INTRALESIONAL; INTRAMUSCULAR; INTRAVENOUS; SOFT TISSUE at 08:21

## 2023-01-17 RX ADMIN — Medication 2 G: at 08:31

## 2023-01-17 RX ADMIN — SUGAMMADEX 200 MG: 100 INJECTION, SOLUTION INTRAVENOUS at 11:51

## 2023-01-17 RX ADMIN — FENTANYL CITRATE 50 MCG: 50 INJECTION, SOLUTION INTRAMUSCULAR; INTRAVENOUS at 11:18

## 2023-01-17 RX ADMIN — PROPOFOL 30 MG: 10 INJECTION, EMULSION INTRAVENOUS at 11:46

## 2023-01-17 ASSESSMENT — ACTIVITIES OF DAILY LIVING (ADL)
ADLS_ACUITY_SCORE: 20
ADLS_ACUITY_SCORE: 35

## 2023-01-17 NOTE — ANESTHESIA PROCEDURE NOTES
Airway       Patient location during procedure: OR       Procedure Start/Stop Times: 1/17/2023 8:18 AM  Staff -        Anesthesiologist:  Norman Moreno MD       CRNA: Sunny Jimenez APRN CRNA       Other Anesthesia Staff: Margarito Farfan       Performed By: SRNA  Consent for Airway        Urgency: elective  Indications and Patient Condition       Indications for airway management: david-procedural       Induction type:intravenous       Mask difficulty assessment: 1 - vent by mask    Final Airway Details       Final airway type: endotracheal airway       Successful airway: ETT - single  Endotracheal Airway Details        ETT size (mm): 7.5       Cuffed: yes       Successful intubation technique: direct laryngoscopy       Grade View of Cords: 1       Adjucts: stylet and bougie       Position: Right       Measured from: gums/teeth       Secured at (cm): 25       Bite block used: None    Post intubation assessment        Placement verified by: capnometry, equal breath sounds and chest rise        Number of attempts at approach: 2       Number of other approaches attempted: 1       Secured with: pink tape       Ease of procedure: easy       Dentition: Intact    Medication(s) Administered   Medication Administration Time: 1/17/2023 8:18 AM    Additional Comments       First attempt, grade 1 view of cords but 7.5 tube would not pass. Second attempt, bougie inserted into trachea and 7.5 tube easily passed into trachea.

## 2023-01-17 NOTE — DISCHARGE INSTRUCTIONS
Surgical Discharge Instructions  1. Start rinsing both nasal cavities with Guille-Med Sinus rinse twice daily. This will assist with healing after surgery. If you do not have sinus rinse equipment, you may use ocean nasal saline spray prescribed after surgery; however, sinus rinse is preferred.   2. Take medications as prescribed.    3. You have been prescribed three medications: a narcotic pain medication (oxycodone) to take as needed for pain not controlled with tylenol; a prednisone taper; and, a medication for nausea (ondansetron, aka zofran) to use for nausea and/or vomiting. Antibiotics may be prescribed to you at a later date based on cultures obtained at the time of surgery.   4. Do not drive or operate machinery while taking narcotic pain medication.   5. For bleeding that is bothersome or excessive, spray afrin (oxymetazoline) nasal spray (four sprays into each nostril) and pinch the tip of the nose closed for 10 minutes. If you find you have done this four times in one hour and are still having bothersome bleeding, please call your doctor.  Afrin has been sent home with you at the day of surgery. If there is blood on the tip of the nasal spray bottle, please don't be alarmed, as this is likely because this was used during or after surgery as you were in the recovery area.   6. Please call your doctor for any headache not controlled with pain medication, severe nausea or vomiting, fevers >100.4, changes in mental status, or any other concerning symptoms you may identify.     Roman Waters MD    [facility]  Center for Skull Base and Pituitary Surgery  [facility]  Department of Otolaryngology - Head & Neck Surgery    [facility]  Same-Day Surgery   Adult Discharge Orders & Instructions     For 24 hours after surgery    Get plenty of rest.  A responsible adult must stay with you for at least 24 hours after you leave the  hospital.   Do not drive or use heavy equipment.  If you have weakness or tingling, don't drive or use heavy equipment until this feeling goes away.  Do not drink alcohol.  Avoid strenuous or risky activities.  Ask for help when climbing stairs.   You may feel lightheaded.  IF so, sit for a few minutes before standing.  Have someone help you get up.   If you have nausea (feel sick to your stomach): Drink only clear liquids such as apple juice, ginger ale, broth or 7-Up.  Rest may also help.  Be sure to drink enough fluids.  Move to a regular diet as you feel able.  You may have a slight fever. Call the doctor if your fever is over 100 F (37.7 C) (taken under the tongue) or lasts longer than 24 hours.  You may have a dry mouth, a sore throat, muscle aches or trouble sleeping.  These should go away after 24 hours.  Do not make important or legal decisions.   Call your doctor for any of the followin.  Signs of infection (fever, growing tenderness at the surgery site, a large amount of drainage or bleeding, severe pain, foul-smelling drainage, redness, swelling).    2. It has been over 8 to 10 hours since surgery and you are still not able to urinate (pass water).    3.  Headache for over 24 hours.    4.  Numbness, tingling or weakness the day after surgery (if you had spinal anesthesia).  To contact a doctor, call Roman Waters MD or:    '   [phone] and ask for the resident on call for   ENT (ear, nose, and throat) (answered 24 hours a day)  '   Emergency Department:    [facility]: [phone]       (TTY for hearing impaired: [phone])    [facility]: [phone]       (TTY for hearing impaired: [phone])

## 2023-01-17 NOTE — BRIEF OP NOTE
[facility]    Brief Operative Note    Pre-operative diagnosis: Chronic pansinusitis [J32.4]  Post-operative diagnosis Same as pre-operative diagnosis    Procedure: Procedure(s):  BILATERAL REVISION ENDOSCOPIC FRONTAL SINUS EXPLORATION WITH TISSUE REMOVAL, TOTAL ETHMOIDECTOMY, MAXILLARY ANTROSTOMY WITH TISSUE REMOVAL, SPHENOIDOTOMY WITH TISSUE REMOVAL  Surgeon: Surgeon(s) and Role:     * Roman Waters MD - Primary  Anesthesia: General   Estimated Blood Loss: 200 ml    Drains: None  Specimens:   ID Type Source Tests Collected by Time Destination   1 : bilateral sinus contents Tissue Other SURGICAL PATHOLOGY EXAM Roman Waters MD 1/17/2023 11:13 AM    A : Right Maxillary Sinus Tissue Sinus, Maxillary, Right ANAEROBIC BACTERIAL CULTURE ROUTINE, AEROBIC BACTERIAL CULTURE ROUTINE Roman Waters MD 1/17/2023  8:59 AM      Findings:   None.  Complications: None.  Implants:   Implant Name Type Inv. Item Serial No.  Lot No. LRB No. Used Action   IMP SINUS PROPEL MINI MOMETASONE FUORATE 370MCCG 16MM [id number] - [id number] Other IMP SINUS PROPEL MINI MOMETASONE FUORATE 370MCCG 16MM [id number]  [employer] [id number] Left 1 Implanted   IMP SINUS PROPEL MINI MOMETASONE FUORATE 370MCCG 16MM [id number] - [id number] Other IMP SINUS PROPEL MINI MOMETASONE FUORATE 370MCCG 16MM [id number] [employer]  Right 1 Implanted     Arabella Dwo MD PGY4  ENT Resident

## 2023-01-17 NOTE — ANESTHESIA CARE TRANSFER NOTE
Patient: Elpidio De La Fuente    Procedure: Procedure(s):  BILATERAL REVISION ENDOSCOPIC FRONTAL SINUS EXPLORATION WITH TISSUE REMOVAL, TOTAL ETHMOIDECTOMY, MAXILLARY ANTROSTOMY WITH TISSUE REMOVAL, SPHENOIDOTOMY WITH TISSUE REMOVAL       Diagnosis: Chronic pansinusitis [J32.4]  Diagnosis Additional Information: No value filed.    Anesthesia Type:   General     Note:    Oropharynx: oral airway in place  Level of Consciousness: drowsy  Oxygen Supplementation: face mask  Level of Supplemental Oxygen (L/min / FiO2): 6  Independent Airway: airway patency satisfactory and stable  Dentition: dentition unchanged  Vital Signs Stable: post-procedure vital signs reviewed and stable    Patient transferred to: PACU    Handoff Report: Identifed the Patient, Identified the Reponsible Provider, Reviewed the pertinent medical history, Discussed the surgical course, Reviewed Intra-OP anesthesia mangement and issues during anesthesia, Set expectations for post-procedure period and Allowed opportunity for questions and acknowledgement of understanding      Vitals:  Vitals Value Taken Time   BP     Temp     Pulse     Resp     SpO2 95 % 01/17/23 1203   Vitals shown include unvalidated device data.    Electronically Signed By: JALYN Herrera CRNA  January 17, 2023  12:04 PM

## 2023-01-17 NOTE — ANESTHESIA PREPROCEDURE EVALUATION
Anesthesia Pre-Procedure Evaluation    Patient: Elpidio De La Fuente   MRN: [id number] : 1965        Procedure : Procedure(s):  BILATERAL REVISION ENDOSCOPIC FRONTAL SINUS EXPLORATION WITH TISSUE REMOVAL, TOTAL ETHMOIDECTOMY, MAXILLARY ANTROSTOMY WITH TISSUE REMOVAL, SPHENOIDOTOMY WITH TISSUE REMOVAL          Past Medical History:   Diagnosis Date     Anxiety      History of kidney cancer      Sleep apnea      Uncomplicated asthma       History reviewed. No pertinent surgical history.   Allergies   Allergen Reactions     Seasonal Allergies Other (See Comments)     Sinus HA     Sulfa Drugs Other (See Comments)     Sheikh Eugene      Social History     Tobacco Use     Smoking status: Not on file     Smokeless tobacco: Not on file   Substance Use Topics     Alcohol use: Not Currently      Wt Readings from Last 1 Encounters:   23 97.2 kg (214 lb 4.6 oz)        Anesthesia Evaluation            ROS/MED HX  ENT/Pulmonary:     (+) sleep apnea, asthma     Neurologic:  - neg neurologic ROS     Cardiovascular:  - neg cardiovascular ROS     METS/Exercise Tolerance:     Hematologic:  - neg hematologic  ROS     Musculoskeletal: Comment: Psoriatic arthritis  (+) arthritis,     GI/Hepatic:  - neg GI/hepatic ROS   (+) GERD,     Renal/Genitourinary:  - neg Renal ROS     Endo:     (+) Obesity,     Psychiatric/Substance Use:  - neg psychiatric ROS   (+) H/O chronic opiod use . : suboxone patch and PO.    Infectious Disease:  - neg infectious disease ROS     Malignancy:  - neg malignancy ROS     Other:               OUTSIDE LABS:  CBC: No results found for: WBC, HGB, HCT, PLT  BMP: No results found for: NA, POTASSIUM, CHLORIDE, CO2, BUN, CR, GLC  COAGS: No results found for: PTT, INR, FIBR  POC: No results found for: BGM, HCG, HCGS  HEPATIC: No results found for: ALBUMIN, PROTTOTAL, ALT, AST, GGT, ALKPHOS, BILITOTAL, BILIDIRECT, DAYSI  OTHER: No results found for: PH, LACT, A1C, JOSSELINE, PHOS, MAG, LIPASE, AMYLASE, TSH, T4, T3,  CRP, SED    Anesthesia Plan    ASA Status:  2   NPO Status:  NPO Appropriate    Anesthesia Type: General.   Induction: Intravenous.   Maintenance: Balanced.        Consents    Anesthesia Plan(s) and associated risks, benefits, and realistic alternatives discussed. Questions answered and patient/representative(s) expressed understanding.    - Discussed:     - Discussed with:  Patient         Postoperative Care       PONV prophylaxis: Ondansetron (or other 5HT-3), Dexamethasone or Solumedrol     Comments:    Other Comments: Continue PTA buprenorphine, ketamine, may require allison opioid requirement            Norman Moreno MD

## 2023-01-17 NOTE — ANESTHESIA POSTPROCEDURE EVALUATION
"Patient: Elpidio De La Fuente    Procedure: Procedure(s):  BILATERAL REVISION ENDOSCOPIC FRONTAL SINUS EXPLORATION WITH TISSUE REMOVAL, TOTAL ETHMOIDECTOMY, MAXILLARY ANTROSTOMY WITH TISSUE REMOVAL, SPHENOIDOTOMY WITH TISSUE REMOVAL       Anesthesia Type:  General    Note:  Disposition: Outpatient   Postop Pain Control: Uneventful            Sign Out: Well controlled pain   PONV: No   Neuro/Psych: Uneventful            Sign Out: Acceptable/Baseline neuro status   Airway/Respiratory: Uneventful            Sign Out: Acceptable/Baseline resp. status   CV/Hemodynamics: Uneventful            Sign Out: Acceptable CV status; No obvious hypovolemia; No obvious fluid overload   Other NRE: NONE   DID A NON-ROUTINE EVENT OCCUR? No    Event details/Postop Comments:  Pain rated 5/10 in sinuses, baseline pain (arthritic 4/10, 3/10 this AM). Reports pain is \"tolerable.\"           Last vitals:  Vitals Value Taken Time   /92 01/17/23 1230   Temp 37.3  C (99.1  F) 01/17/23 1201   Pulse 90 01/17/23 1245   Resp 18 01/17/23 1245   SpO2 94 % 01/17/23 1245   Vitals shown include unvalidated device data.    Electronically Signed By: Norman Moreno MD  January 17, 2023  12:46 PM  "

## 2023-01-19 NOTE — OP NOTE
Procedure Date: 01/17/2023    PREOPERATIVE DIAGNOSES:    1.  Chronic pansinusitis with nasal polyposis.  2.  Nasal obstruction.  3.  Atypical facial pain.  4.  Hyposmia.  5.  Reactive lower airway disease.    POSTOPERATIVE DIAGNOSES:  1.  Chronic pansinusitis with nasal polyposis.  2.  Nasal obstruction.  3.  Atypical facial pain.  4.  Hyposmia.  5.  Reactive lower airway disease.    PROCEDURE PERFORMED:     1.  Bilateral endoscopic frontal sinus exploration with tissue removal, total ethmoidectomy.  2.  Bilateral endoscopic sphenoidotomy with tissue removal.  3.  Bilateral endoscopic revision maxillary antrostomy with tissue removal.  4.  Bilateral endoscopic partial inferior turbinate resection.  5.  Computerized image guidance, extradural.    SURGEON:  Roman Waters MD    ASSISTANT:  Arabella Dow MD    INDICATIONS FOR PROCEDURE:  Elpidio De La Fuente is a pleasant 57-year-old gentleman who has a history of severe chronic sinusitis with nasal polyposis and multiple prior surgeries, as well as a history of asthma who had persistent ongoing symptoms related to chronic pansinusitis despite appropriate medical therapy and even biologic treatment with Xolair.  He was a candidate for revision surgery as listed above, and after discussion of the risks, benefits and alternatives of surgery at length, he was agreeable to proceed.    DESCRIPTION OF PROCEDURE IN DETAIL:  The patient was identified in the preoperative holding area.  Consent was confirmed.  He was subsequently brought back to the operating room where general anesthesia was induced and he was intubated without issue.  The eyes were protected.  Timeout was performed.  All were in agreement.  He was subsequently turned 180 degrees and prepped and draped in standard fashion in preparation for endoscopic sinonasal surgery.  We performed a contour-based image guidance registration using [employer] image guidance registration system.  Several landmarks across the  facial skeleton were used to perform good target registration with minimal error.  This was used throughout the case to confirm important landmarks, including the skull base, orbit, as well as perform frontal sinus dissection, which was especially important in this patient who had a severe burden of inflammation as well as extensive prior sinus surgery history.  We started the procedure by decongesting bilateral nasal cavities with 1:100,000 epinephrine-soaked pledgets.  We started the procedure on the left side.  We injected the residual polyp tissue within the nasal cavity as well as the lateral nasal wall.  We started the procedure on the left side. We injected the polyp that was extending into the nasal cavity as well as lateral nasal wall and residual inferior turbinate.  There was evidence of growth extending towards the nasal cavity floor as well as an inferior meatal window on the left maxillary sinus.  The polyp was debulked superiorly towards the olfactory cleft in which I identified the middle turbinate. We proceeded to perform revision maxillary antrostomy on the left side.  There was residual uncinate of the horizontal process of the uncinate as well as maxillary sinus bony overhang towards the posterior ethmoid cavity, which was removed using straight Thru-Cutting forceps as well as microdebrider.  There was polypoid tissue within the maxillary sinus that was removed using a curved shaver. We then performed a dolores-antrostomy on the left side.  The residual inferior turbinate was resected laterally towards the lateral nasal wall and suction Bovie electrocautery once removed. The medial maxillary sinus wall was removed using maxillary downbiting punch as well as backbiter and microdebrider.  There was additional polypoid tissue within the maxillary sinus, which was removed using a curved shaver after this was done. We then proceeded with performing revision total ethmoidectomy.  There was extensive polyp  disease of the ethmoid cavity, which was removed in both the anterior and posterior ethmoid cavity.  Residual ethmoid partitions were removed using upbiting forceps.  We then encountered the sphenoid sinus, which was widely opened, but there was residual sphenoid face inferiorly, which was removed using a straight Hosemann punch.  There was polypoid tissue within the sphenoid sinus, which was removed using a microdebrider.  We then continued our dissection anteriorly, removing additional polyp tissue from the ethmoid roof skull base using a curved shaver. We then switched to a 30-degree telescope and using a curved navigation probe, we identified the frontal recess.  There was polyp tissue extending from the frontal sinus into the frontal recess, which was removed using a curved shaver, this was debrided superiorly towards the frontal sinus.  There was polyp tissue, which was pedicled off of the lateral lamella of the skull base as well as cribriform plate.  This was shaved gently until we reached the lateral lamella of the cribriform plate.  We took special care not to cause undue injury to the skull base in this region.  The frontal sinus was cleared of all polyp tissue, which was removed using a curved shaver and once this was done, the frontal sinus was irrigated using warm saline irrigation.  Once we were happy with our dissection on the left side, we irrigated the sinonasal cavity.  Meticulous hemostasis was ensured.  We placed a steroid-eluting stent into the frontal ostium on the left side, and placed a NasoPore sponge into the middle meatus to medialize the middle turbinate.  We then turned our attention to the right side. We again injected the residual inferior turbinate against the lateral nasal wall towards the axilla as well as polyp tissue.  We then proceeded with dissection.  There was polyp tissue of the olfactory cleft in the middle meatus, which was shaved using a microdebrider.  We then turned our  attention to the maxillary sinus.  There was polyp tissue within the maxillary sinus, which was debrided using a microdebrider.  There was prior modified endoscopic medial maxillectomy, which had been performed.  This was revised and we removed the medial maxillary sinus wall using a backbiter and removed the bony ridge along the floor of the maxillary sinus using downbiting instrumentation.  The posterior bony maxillary sinus wall was removed using straight Thru-Cutting forceps.  We also performed a partial inferior turbinate resection by removing the residual posterior inferior turbinate stump using straight Thru-Cutting forceps and suction bovied the residual stump using the suction Bovie.  We then turned our attention and performed revision total ethmoidectomy.  There was polyp tissue of the anterior and posterior ethmoid cavity, which was shaved using a microdebrider.  Residual partitions of the anterior and posterior ethmoid cavity were removed using upbiting forceps as well as microdebrider.  Sphenoid sinus was identified and dilated using a [place] elevator.  It was opened widely using straight Hosemann punch, angled Thru-Cutting forceps, as well as microdebrider.  There was polyp tissue within the sphenoid sinus, which was removed gently using a microdebrider.  We continued our dissection anteriorly, removing additional partitions off the ethmoid roof skull base until we identified the frontal recess using a curved navigation probe.  There is polyp tissue extending into the frontal recess from the frontal sinus.  This was shaved using RAD 90 microdebrider. Using angled telescopic visualization, we removed polyp tissue from the frontal sinus using a microdebrider.  Once we were happy with our dissection, we irrigated the frontal sinus out and removed copious purulent secretions from the frontal sinus.  Once we were happy with our dissection, we placed a steroid-eluting stent into the frontal ostium on the  right side and we placed NasoPore sponge in the middle meatus after meticulous hemostasis was ensured.  An orogastric tube was placed in the stomach and the contents were suctioned.  We subsequently turned the patient over to Anesthesia for awakening.  He was extubated in uneventful fashion and transferred to PACU in stable condition.  I did call the daughter and updated her regarding the outcome of surgery.    Roman Waters MD        D: 2023   T: 2023   MT: ANKUSH    Name:     DONNY SLADEIsac  MRN:      [id number]        Account:        [id number]   :      1965           Procedure Date: 2023     Document: [id number]

## 2023-01-20 ENCOUNTER — MYC MEDICAL ADVICE (OUTPATIENT)
Dept: OTOLARYNGOLOGY | Facility: CLINIC | Age: 58
End: 2023-01-20
Payer: COMMERCIAL

## 2023-01-20 ENCOUNTER — TELEPHONE (OUTPATIENT)
Dept: OTOLARYNGOLOGY | Facility: CLINIC | Age: 58
End: 2023-01-20
Payer: COMMERCIAL

## 2023-01-20 DIAGNOSIS — A49.01 STAPH AUREUS INFECTION: Primary | ICD-10-CM

## 2023-01-20 LAB
BACTERIA SINUS CULT: ABNORMAL

## 2023-01-20 RX ORDER — CEPHALEXIN 500 MG/1
500 CAPSULE ORAL 4 TIMES DAILY
Qty: 40 CAPSULE | Refills: 0 | Status: SHIPPED | OUTPATIENT
Start: 2023-01-20 | End: 2023-01-30

## 2023-01-20 NOTE — TELEPHONE ENCOUNTER
Writer spoke with patient, confirmed he should follow-up with his rheumatologist regarding medication questions.     Also informed him of his culture results and antibiotic.     Responsible Attending Physician: Jeff Waters  Date of Discharge: 1/17/23    Discharge to:  Home     Current Status:  Pt is a 58 y/o male s/p BILATERAL REVISION ENDOSCOPIC FRONTAL SINUS EXPLORATION WITH TISSUE REMOVAL, TOTAL ETHMOIDECTOMY, MAXILLARY ANTROSTOMY WITH TISSUE REMOVAL, SPHENOIDOTOMY WITH TISSUE REMOVAL on 1/17/23.  Reports pre-op symptoms improved.   Operative  pain is decreasing.  Ambulating without assistance.  Pain well controlled with current meds, ample supply. Denies redness, swelling, increased tenderness, or elevated temp.  Denies current bowel or bladder issues.  No visible sutures/staples in place.        Discharge instructions and medication use were reviewed.  RN triage/on call number given:  [phone] or after hours and w/e - [phone].  Patient verbalized understanding and agreement with current plan.     Follow up appointments: 1/25/23

## 2023-01-20 NOTE — TELEPHONE ENCOUNTER
----- Message from Roman Waters MD sent at 1/20/2023 10:57 AM CST -----  Mushtaq Snyder,   Can we let Elpidio know about his culture results and prescribe 500mg keflex four times daily for 10 days?  Thanks,  Jeff

## 2023-01-23 LAB
PATH REPORT.COMMENTS IMP SPEC: NORMAL
PATH REPORT.COMMENTS IMP SPEC: NORMAL
PATH REPORT.FINAL DX SPEC: NORMAL
PATH REPORT.GROSS SPEC: NORMAL
PATH REPORT.MICROSCOPIC SPEC OTHER STN: NORMAL
PATH REPORT.MICROSCOPIC SPEC OTHER STN: NORMAL
PATH REPORT.RELEVANT HX SPEC: NORMAL
PHOTO IMAGE: NORMAL

## 2023-01-24 LAB — BACTERIA SINUS CULT: NORMAL

## 2023-01-25 ENCOUNTER — OFFICE VISIT (OUTPATIENT)
Dept: OTOLARYNGOLOGY | Facility: CLINIC | Age: 58
End: 2023-01-25
Payer: COMMERCIAL

## 2023-01-25 VITALS
WEIGHT: 217.4 LBS | BODY MASS INDEX: 31.12 KG/M2 | HEIGHT: 70 IN | TEMPERATURE: 97.2 F | SYSTOLIC BLOOD PRESSURE: 140 MMHG | DIASTOLIC BLOOD PRESSURE: 87 MMHG

## 2023-01-25 DIAGNOSIS — J32.4 CHRONIC PANSINUSITIS: ICD-10-CM

## 2023-01-25 DIAGNOSIS — J33.9 NASAL POLYPOSIS: Primary | ICD-10-CM

## 2023-01-25 PROCEDURE — 99207 PR CDG-PROCEDURE CHARGE ONLY: CPT | Performed by: OTOLARYNGOLOGY

## 2023-01-25 PROCEDURE — 31237 NSL/SINS NDSC SURG BX POLYPC: CPT | Mod: 50 | Performed by: OTOLARYNGOLOGY

## 2023-01-25 RX ORDER — DUPILUMAB 300 MG/2ML
300 INJECTION, SOLUTION SUBCUTANEOUS
Qty: 12 ML | Refills: 2 | Status: SHIPPED | OUTPATIENT
Start: 2023-01-25

## 2023-01-25 ASSESSMENT — PAIN SCALES - GENERAL: PAINLEVEL: NO PAIN (0)

## 2023-01-25 NOTE — PROGRESS NOTES
[facility]  Return Visit  Encounter date:   January 25, 2023    Chief Complaint:   First Post op    ID: Chronic sinusitis s/p sinonasal surgery as below on 1/17/23    Interval History:   Elpidio De La Fuente is a 57 year old male who presents for follow up s/p sinonasal surgery on 1/17/23. He has been doing well since surgery. He has been rinsing but yesterday noticed a decrease in the output. He has had some bleeding from the nose.     Sino-Nasal Outcome Test (SNOT - 22)  DNT    [facility]  Procedure Date: 01/17/2023     PREOPERATIVE DIAGNOSES:    1.  Chronic pansinusitis with nasal polyposis.  2.  Nasal obstruction.  3.  Atypical facial pain.  4.  Hyposmia.  5.  Reactive lower airway disease.     POSTOPERATIVE DIAGNOSES:  1.  Chronic pansinusitis with nasal polyposis.  2.  Nasal obstruction.  3.  Atypical facial pain.  4.  Hyposmia.  5.  Reactive lower airway disease.     PROCEDURE PERFORMED:     1.  Bilateral endoscopic frontal sinus exploration with tissue removal, total ethmoidectomy.  2.  Bilateral endoscopic sphenoidotomy with tissue removal.  3.  Bilateral endoscopic revision maxillary antrostomy with tissue removal.  4.  Bilateral endoscopic partial inferior turbinate resection.  5.  Computerized image guidance, extradural.     SURGEON:  Roman Waters MD     ASSISTANT:  Arabella Dow MD    Review of systems: A 14-point review of systems has been conducted and is negative for any notable symptoms, except as dictated in the history of present illness.     Physical Exam:  Vital signs: There were no vitals taken for this visit.   General Appearance: No acute distress, appropriate demeanor, conversant  Eyes: moist conjunctivae; EOMI; pupils symmetric; visual acuity grossly intact; no proptosis  Head: normocephalic; overall symmetric appearance without deformity  Face: overall symmetric without deformity; HB I/VI  Nose: No external deformity; see endoscopy  Lungs: symmetric chest  rise; no wheezing  CV: Good distal perfusion; normal hear rate  Extremities: No deformity  Neurologic Exam: Cranial nerves II-XII are grossly intact; no focal deficit     Procedure Note  Procedure performed: Rigid nasal endoscopy with bilateral debridement  Indication: To evaluate for sinonasal pathology not visualized on routine anterior rhinoscopy  Anesthesia: 4% topical lidocaine with 0.05 % oxymetazoline  Description of procedure: A 30 degree, 3 mm rigid endoscope was inserted into bilateral nasal cavities and the nasal valves, nasal cavity, middle meatus, sphenoethmoid recess, nasopharynx were evaluated for evidence of obstruction, edema, purulence, polyps and/or mass/lesion.     I then used a combination of non-cutting forceps, straight and curved suction to clear bilateral surgical cavities of packing, clot, crust, and fibrinopurulent debris.      Findings  Post surgical findings as expected  Massive crusting around maxillary sinuses and nasal cavities cleared  Propel stents removed bilat  All sinuses patent after debridement    The patient tolerated the procedure well without complication.     Laboratory Review:  n/a     Imaging Review:  I personally reviewed images from the CT sinus from November 2, 2022:  There is pansinus mucosal thickening, most severe in the bilateral frontal sinuses. There are extensive new osteogenesis of many of the sinuses, especially the sphenoid sinus.  There are extensive surgical changes, including: Near complete resection of the right middle turbinate flush with the skull base, right endoscopic medial maxillectomy changes, left inferior meatal window, left partial resection of middle turbinate, septal perforation with button in place, extensive new osteogenesis of the high nasofrontal beak and high bony nasal septum.     Pathology Review:  n/a     Assessment/Medical Decision Making/Plan:  CRSwNP with comorbid asthma  S/p sinonasal surgery 1/17/23  History of psoriatic  arthritis  Hyposmia and nasal congestion secondary to CRSwNP  Atypical facial pain      Plan:  Bilateral endoscopy performed today with post operative debridement. Continue with prednisone and switch to regular saline rinse instead of budesonide with ocean spray. He would also like to start Dupixent and we will start with initiation. Follow-up in 3-4 weeks.     Roman Waters MD    [facility]  Rhinology, Endoscopic Skull Base Surgery  [facility]  Department of Otolaryngology - Head & Neck Surgery    Scribe Disclosure:  I, Riley Foster, am serving as a scribe to document services personally performed by Roman Waters MD at this visit, based upon the provider's statements to me. All documentation has been reviewed by the aforementioned provider prior to being entered into the official medical record.     Additional portions of the patient's history have been reviewed below.   ~~~~~~~~~~~~~~~~~~~~~~~~~~~~~~~~~~~~~~~~~~~~~~~~~~~~~~~~~~~~~~~~~~~~~~~~~~~~~~~~~~~~~~~~~~~~~~~~~~~~~~~~~~~~~~~~~~~~~~~~~~~~~~~~~~~~~~~    Past Medical History:   Diagnosis Date     Anxiety      History of kidney cancer      Sleep apnea      Uncomplicated asthma         Past Surgical History:   Procedure Laterality Date     ENDOSCOPIC ENDONASAL SURGERY Bilateral 1/17/2023    Procedure: BILATERAL REVISION ENDOSCOPIC FRONTAL SINUS EXPLORATION WITH TISSUE REMOVAL, TOTAL ETHMOIDECTOMY, MAXILLARY ANTROSTOMY WITH TISSUE REMOVAL, SPHENOIDOTOMY WITH TISSUE REMOVAL;  Surgeon: Roman Waters MD;  Location:  OR        No family history on file.     Social History     Socioeconomic History     Marital status:    Substance and Sexual Activity     Alcohol use: Not Currently     Drug use: Never

## 2023-01-25 NOTE — LETTER
Date:January 30, 2023      Patient was self referred, no letter generated. Do not send.        [facility] Health Information

## 2023-01-25 NOTE — PROGRESS NOTES
Paperwork for Dupixent therapy faxed to the [email or web address] program. Prescription escribed through to [facility]. Patient was advised that a prior authorization will be sent through for approval of the medication with his insurance. Teaching of the use of the Dupixent pen was previously demonstrated at patient's visit on January 16th.    Signed Prescriptions:                        Disp   Refills    dupilumab (DUPIXENT) 300 MG/2ML prefilled *12 mL  2        Sig: Inject 2 mLs (300 mg) Subcutaneous every 14 days  Authorizing Provider: MARILY BORDEN    Will await approval of medication.    JOAQUIN SHORT LPN on 1/25/2023 at 4:12 PM

## 2023-01-25 NOTE — LETTER
1/25/2023       RE: Elpidio De La Fuente  [address]  [place] [address]     Dear Colleague,    Thank you for referring your patient, Elpidio De La Fuente, to the [facility] E[facility] NOSE AND THROAT CLINIC [place] at [facility]. Please see a copy of my visit note below.      [facility]  Return Visit  Encounter date:   January 25, 2023    Chief Complaint:   First Post op    ID: Chronic sinusitis s/p sinonasal surgery as below on 1/17/23    Interval History:   Elpidio De La Fuente is a 57 year old male who presents for follow up s/p sinonasal surgery on 1/17/23. He has been doing well since surgery. He has been rinsing but yesterday noticed a decrease in the output. He has had some bleeding from the nose.     Sino-Nasal Outcome Test (SNOT - 22)  DNT    [place] [facility]  Procedure Date: 01/17/2023     PREOPERATIVE DIAGNOSES:    1.  Chronic pansinusitis with nasal polyposis.  2.  Nasal obstruction.  3.  Atypical facial pain.  4.  Hyposmia.  5.  Reactive lower airway disease.     POSTOPERATIVE DIAGNOSES:  1.  Chronic pansinusitis with nasal polyposis.  2.  Nasal obstruction.  3.  Atypical facial pain.  4.  Hyposmia.  5.  Reactive lower airway disease.     PROCEDURE PERFORMED:     1.  Bilateral endoscopic frontal sinus exploration with tissue removal, total ethmoidectomy.  2.  Bilateral endoscopic sphenoidotomy with tissue removal.  3.  Bilateral endoscopic revision maxillary antrostomy with tissue removal.  4.  Bilateral endoscopic partial inferior turbinate resection.  5.  Computerized image guidance, extradural.     SURGEON:  Roman Waters MD     ASSISTANT:  Arabella Dow MD    Review of systems: A 14-point review of systems has been conducted and is negative for any notable symptoms, except as dictated in the history of present illness.     Physical Exam:  Vital signs: There were no vitals taken for this visit.   General Appearance: No acute distress,  appropriate demeanor, conversant  Eyes: moist conjunctivae; EOMI; pupils symmetric; visual acuity grossly intact; no proptosis  Head: normocephalic; overall symmetric appearance without deformity  Face: overall symmetric without deformity; HB I/VI  Nose: No external deformity; see endoscopy  Lungs: symmetric chest rise; no wheezing  CV: Good distal perfusion; normal hear rate  Extremities: No deformity  Neurologic Exam: Cranial nerves II-XII are grossly intact; no focal deficit     Procedure Note  Procedure performed: Rigid nasal endoscopy with bilateral debridement  Indication: To evaluate for sinonasal pathology not visualized on routine anterior rhinoscopy  Anesthesia: 4% topical lidocaine with 0.05 % oxymetazoline  Description of procedure: A 30 degree, 3 mm rigid endoscope was inserted into bilateral nasal cavities and the nasal valves, nasal cavity, middle meatus, sphenoethmoid recess, nasopharynx were evaluated for evidence of obstruction, edema, purulence, polyps and/or mass/lesion.     I then used a combination of non-cutting forceps, straight and curved suction to clear bilateral surgical cavities of packing, clot, crust, and fibrinopurulent debris.      Findings  Post surgical findings as expected  Massive crusting around maxillary sinuses and nasal cavities cleared  Propel stents removed bilat  All sinuses patent after debridement    The patient tolerated the procedure well without complication.     Laboratory Review:  n/a     Imaging Review:  I personally reviewed images from the CT sinus from November 2, 2022:  There is pansinus mucosal thickening, most severe in the bilateral frontal sinuses. There are extensive new osteogenesis of many of the sinuses, especially the sphenoid sinus.  There are extensive surgical changes, including: Near complete resection of the right middle turbinate flush with the skull base, right endoscopic medial maxillectomy changes, left inferior meatal window, left partial  resection of middle turbinate, septal perforation with button in place, extensive new osteogenesis of the high nasofrontal beak and high bony nasal septum.     Pathology Review:  n/a     Assessment/Medical Decision Making/Plan:  CRSwNP with comorbid asthma  S/p sinonasal surgery 1/17/23  History of psoriatic arthritis  Hyposmia and nasal congestion secondary to CRSwNP  Atypical facial pain      Plan:  Bilateral endoscopy performed today with post operative debridement. Continue with prednisone and switch to regular saline rinse instead of budesonide with ocean spray. He would also like to start Dupixent and we will start with initiation. Follow-up in 3-4 weeks.     Roman Waters MD    [facility]  Rhinology, Endoscopic Skull Base Surgery  [facility]  Department of Otolaryngology - Head & Neck Surgery    Scribe Disclosure:  I, Riley Foster, am serving as a scribe to document services personally performed by Roman Waters MD at this visit, based upon the provider's statements to me. All documentation has been reviewed by the aforementioned provider prior to being entered into the official medical record.     Additional portions of the patient's history have been reviewed below.   ~~~~~~~~~~~~~~~~~~~~~~~~~~~~~~~~~~~~~~~~~~~~~~~~~~~~~~~~~~~~~~~~~~~~~~~~~~~~~~~~~~~~~~~~~~~~~~~~~~~~~~~~~~~~~~~~~~~~~~~~~~~~~~~~~~~~~~~    Past Medical History:   Diagnosis Date     Anxiety      History of kidney cancer      Sleep apnea      Uncomplicated asthma         Past Surgical History:   Procedure Laterality Date     ENDOSCOPIC ENDONASAL SURGERY Bilateral 1/17/2023    Procedure: BILATERAL REVISION ENDOSCOPIC FRONTAL SINUS EXPLORATION WITH TISSUE REMOVAL, TOTAL ETHMOIDECTOMY, MAXILLARY ANTROSTOMY WITH TISSUE REMOVAL, SPHENOIDOTOMY WITH TISSUE REMOVAL;  Surgeon: Roman Waters MD;  Location:  OR        No family history on file.     Social History     Socioeconomic History      Marital status:    Substance and Sexual Activity     Alcohol use: Not Currently     Drug use: Never         Paperwork for Dupixent therapy faxed to the [employer]. Prescription escribed through to [facility]. Patient was advised that a prior authorization will be sent through for approval of the medication with his insurance. Teaching of the use of the Dupixent pen was previously demonstrated at patient's visit on January 16th.    Signed Prescriptions:                        Disp   Refills    dupilumab (DUPIXENT) 300 MG/2ML prefilled *12 mL  2        Sig: Inject 2 mLs (300 mg) Subcutaneous every 14 days  Authorizing Provider: MARILY WATERS    Will await approval of medication.    JOAQUIN SHORT, LPN on 1/25/2023 at 4:12 PM        Again, thank you for allowing me to participate in the care of your patient.      Sincerely,    Marily Waters MD

## 2023-01-25 NOTE — PATIENT INSTRUCTIONS
You were seen in the ENT Clinic today by Dr. Waters. If you have any questions or concerns after your appointment, please contact us (see below)      2.   The following recommendations have been made based upon your appointment today:   -Saline rinses only 1-2 times daily.   -Continue antibiotics and prednisone.    3.   Please return to the ENT clinic in 3- 4 weeks.           How to Contact Us:  Send a "[employer] message to your provider. Our team will respond to you via "[employer]. Occasionally, we will need to call you to get further information.  For urgent matters (Monday-Friday), call the ENT Clinic: [phone] and speak with a call center team member - they will route your call appropriately.   If you'd like to speak directly with a nurse, please find our contact information below. We do our best to check voicemail frequently throughout the day, and will work to call you back within 1-2 days. For urgent matters, please use the general clinic phone numbers listed above.        Lillian RITCHIE RN  ENT RN Care Coordinator  Direct: [phone]  Kenia BUNCH LPN  Direct: [phone]         [facility]  Department of Otolaryngology

## 2023-01-26 ENCOUNTER — TELEPHONE (OUTPATIENT)
Dept: OTOLARYNGOLOGY | Facility: CLINIC | Age: 58
End: 2023-01-26
Payer: COMMERCIAL

## 2023-01-26 NOTE — TELEPHONE ENCOUNTER
PA Initiation    Medication: Dupixent  Insurance Company: [employer] - Phone [phone] Fax [phone]  Pharmacy Filling the Rx: [facility] - [place], [place] - [facility] [address] WONG  Filling Pharmacy Phone:    Filling Pharmacy Fax:    Start Date: 1/26/2023    [id number]

## 2023-01-31 NOTE — TELEPHONE ENCOUNTER
PRIOR AUTHORIZATION DENIED    Medication: Dupixent    Denial Date: 1/31/2023    Denial Rational: must have tried and failed 2 nasal corticosteroid sprays and one oral corticosteroid    Appeal Information:

## 2023-02-08 ENCOUNTER — TELEPHONE (OUTPATIENT)
Dept: OTOLARYNGOLOGY | Facility: CLINIC | Age: 58
End: 2023-02-08
Payer: COMMERCIAL

## 2023-02-08 DIAGNOSIS — J33.9 NASAL POLYPOSIS: Primary | ICD-10-CM

## 2023-02-08 DIAGNOSIS — J32.4 CHRONIC PANSINUSITIS: ICD-10-CM

## 2023-02-08 RX ORDER — DUPILUMAB 300 MG/2ML
300 INJECTION, SOLUTION SUBCUTANEOUS
Qty: 4 ML | Refills: 6 | Status: SHIPPED | OUTPATIENT
Start: 2023-02-08

## 2023-02-08 NOTE — TELEPHONE ENCOUNTER
Faxed denial letter to [employer] for processing.    This writer also sent new prescription over to [facility] per request of [employer] due to patient's insurance coverage for mail order pharmacy..     This writer called patient regarding getting medication from the Quick Start program while the appeal is processing. Patient advised that he did talk to someone today and got enrolled in the program.    Patient did not have any questions for this writer and was advised that we would continue to work on his appeal.    Patient verbalizes understanding of this plan and is in agreement. Patient has no further questions at this time.    JOAQUIN SHORT, LUIZ on 2/8/2023 at 5:02 PM

## 2023-02-12 ENCOUNTER — HEALTH MAINTENANCE LETTER (OUTPATIENT)
Age: 58
End: 2023-02-12

## 2023-02-16 NOTE — TELEPHONE ENCOUNTER
Medication Appeal Initiation    We have initiated an appeal for the requested medication:  Medication: Dupixent  Appeal Start Date:  2/16/2023  Insurance Company: [employer] - Phone [phone] Fax [phone]  Comments:  Insurance requires an authorization form from pt, I am waiting for that form

## 2023-02-24 ENCOUNTER — OFFICE VISIT (OUTPATIENT)
Dept: OTOLARYNGOLOGY | Facility: CLINIC | Age: 58
End: 2023-02-24
Payer: COMMERCIAL

## 2023-02-24 VITALS
DIASTOLIC BLOOD PRESSURE: 92 MMHG | WEIGHT: 217 LBS | BODY MASS INDEX: 31.07 KG/M2 | SYSTOLIC BLOOD PRESSURE: 144 MMHG | OXYGEN SATURATION: 100 % | HEART RATE: 92 BPM | HEIGHT: 70 IN | TEMPERATURE: 98.6 F

## 2023-02-24 DIAGNOSIS — J32.4 CHRONIC PANSINUSITIS: Primary | ICD-10-CM

## 2023-02-24 PROCEDURE — 31237 NSL/SINS NDSC SURG BX POLYPC: CPT | Mod: 50 | Performed by: OTOLARYNGOLOGY

## 2023-02-24 ASSESSMENT — PAIN SCALES - GENERAL: PAINLEVEL: NO PAIN (0)

## 2023-02-24 NOTE — PROGRESS NOTES
[facility]  Return Visit  Encounter date:   February 24, 2023    Chief Complaint:   Second post op    ID: Chronic sinusitis s/p sinonasal surgery as below on 1/17/23       Interval History:   Elpidio De La Fuente is a 57 year old male who presents for follow up. He has switched to Dupixent since our last appointment. He has had one dose. He has been able to taste his diet coke since starting, which he could not prior. He does not have a noticeable return in his sense of smell yet.    Sino-Nasal Outcome Test (SNOT - 22)   DNT    [place] Operative [facility]  Procedure Date: 01/17/2023     PREOPERATIVE DIAGNOSES:    1.  Chronic pansinusitis with nasal polyposis.  2.  Nasal obstruction.  3.  Atypical facial pain.  4.  Hyposmia.  5.  Reactive lower airway disease.     POSTOPERATIVE DIAGNOSES:  1.  Chronic pansinusitis with nasal polyposis.  2.  Nasal obstruction.  3.  Atypical facial pain.  4.  Hyposmia.  5.  Reactive lower airway disease.     PROCEDURE PERFORMED:     1.  Bilateral endoscopic frontal sinus exploration with tissue removal, total ethmoidectomy.  2.  Bilateral endoscopic sphenoidotomy with tissue removal.  3.  Bilateral endoscopic revision maxillary antrostomy with tissue removal.  4.  Bilateral endoscopic partial inferior turbinate resection.  5.  Computerized image guidance, extradural.     SURGEON:  Roman Waters MD     ASSISTANT:  Arabella Dow MD    Review of systems: A 14-point review of systems has been conducted and is negative for any notable symptoms, except as dictated in the history of present illness.     Physical Exam:  Vital signs: There were no vitals taken for this visit.   General Appearance: No acute distress, appropriate demeanor, conversant  Eyes: moist conjunctivae; EOMI; pupils symmetric; visual acuity grossly intact; no proptosis  Head: normocephalic; overall symmetric appearance without deformity  Face: overall symmetric without deformity; HB I/VI  Nose: No external  deformity; see endoscopy  Lungs: symmetric chest rise; no wheezing  CV: Good distal perfusion; normal hear rate  Extremities: No deformity  Neurologic Exam: Cranial nerves II-XII are grossly intact; no focal deficit     I then used a combination of non-cutting forceps, straight and curved suction to clear bilateral surgical cavities of packing, clot, crust, and fibrinopurulent debris.      Procedure Note  Procedure performed: Rigid nasal endoscopy with bilateral debridement  Indication: To evaluate for sinonasal pathology not visualized on routine anterior rhinoscopy  Anesthesia: 4% topical lidocaine with 0.05 % oxymetazoline  Description of procedure: A 30 degree, 3 mm rigid endoscope was inserted into bilateral nasal cavities and the nasal valves, nasal cavity, middle meatus, sphenoethmoid recess, nasopharynx were evaluated for evidence of obstruction, edema, purulence, polyps and/or mass/lesion.     I then used a combination of non-cutting forceps, straight and curved suction to clear bilateral surgical cavities of packing, clot, crust, and fibrinopurulent debris.       [place]-Luis Endoscopic Scoring System  Endoscopic observation Right Left   Polyps in middle meatus (0 = absent, 1 = restricted to middle meatus, 2 = Beyond middle meatus) 0 0   Discharge (0 = absent, 1 = thin and clear, 2 = thick, purulent) 0 0   Edema (0 = absent, 1 = mild-moderate, 2 = moderate-severe) 1 1   Crusting (0 = absent, 1 = mild-moderate, 2 = moderate-severe) 1 1   Scarring (0= absent, 1 = mild-moderate, 2 = moderate-severe) 0 0   Total 2 2     Findings  RT: Ethmoid cavity polypoid edema; all sinuses widely patent  LT: Ethmoid polypoid edema; crust around the maxillary sinus sphenoid sinus is clear; all sinuses patent    The patient tolerated the procedure well without complication.     Laboratory Review:  n/a     Imaging Review:  I personally reviewed images from the CT sinus from November 2, 2022:  There is pansinus mucosal  thickening, most severe in the bilateral frontal sinuses. There are extensive new osteogenesis of many of the sinuses, especially the sphenoid sinus.  There are extensive surgical changes, including: Near complete resection of the right middle turbinate flush with the skull base, right endoscopic medial maxillectomy changes, left inferior meatal window, left partial resection of middle turbinate, septal perforation with button in place, extensive new osteogenesis of the high nasofrontal beak and high bony nasal septum.     Pathology Review:  n/a     Assessment/Medical Decision Making/Plan:  CRSwNP with comorbid asthma  S/p sinonasal surgery 1/17/23  History of psoriatic arthritis  Hyposmia and nasal congestion secondary to CRSwNP  Atypical facial pain      Plan:  Bilateral endoscopy performed today with debridement. His sinuses continue to heal appropriately. He should continue with Dupixent, rinse as needed, Flonase, and follow-up in 2 months.     Roman Waters MD    [facility]  Rhinology, Endoscopic Skull Base Surgery  [facility]  Department of Otolaryngology - Head & Neck Surgery    Scribe Disclosure:  I, Riley Foster, am serving as a scribe to document services personally performed by Roman Waters MD at this visit, based upon the provider's statements to me. All documentation has been reviewed by the aforementioned provider prior to being entered into the official medical record.     Additional portions of the patient's history have been reviewed below.   ~~~~~~~~~~~~~~~~~~~~~~~~~~~~~~~~~~~~~~~~~~~~~~~~~~~~~~~~~~~~~~~~~~~~~~~~~~~~~~~~~~~~~~~~~~~~~~~~~~~~~~~~~~~~~~~~~~~~~~~~~~~~~~~~~~~~~~~    Past Medical History:   Diagnosis Date     Anxiety      History of kidney cancer      Sleep apnea      Uncomplicated asthma         Past Surgical History:   Procedure Laterality Date     ENDOSCOPIC ENDONASAL SURGERY Bilateral 1/17/2023    Procedure: BILATERAL REVISION  ENDOSCOPIC FRONTAL SINUS EXPLORATION WITH TISSUE REMOVAL, TOTAL ETHMOIDECTOMY, MAXILLARY ANTROSTOMY WITH TISSUE REMOVAL, SPHENOIDOTOMY WITH TISSUE REMOVAL;  Surgeon: Roman Waters MD;  Location:  OR        No family history on file.     Social History     Socioeconomic History     Marital status:    Substance and Sexual Activity     Alcohol use: Not Currently     Drug use: Never

## 2023-02-24 NOTE — PATIENT INSTRUCTIONS
You were seen in the ENT Clinic today by Dr. Waters. If you have any questions or concerns after your appointment, please contact us (see below)      2.   The following recommendations have been made based upon your appointment today:   -Continue sinus rinses and Flonase nasal spray.    3.   Please return to the ENT clinic in 2 months.           How to Contact Us:  Send a [employer] message to your provider. Our team will respond to you via [employer]. Occasionally, we will need to call you to get further information.  For urgent matters (Monday-Friday), call the ENT Clinic: [phone] and speak with a call center team member - they will route your call appropriately.   If you'd like to speak directly with a nurse, please find our contact information below. We do our best to check voicemail frequently throughout the day, and will work to call you back within 1-2 days. For urgent matters, please use the general clinic phone numbers listed above.        Lillian RITCHIE RN  ENT RN Care Coordinator  Direct: [phone]  Kenia BUNCH LPN  Direct: [phone]         [facility]  Department of Otolaryngology

## 2023-02-24 NOTE — LETTER
2/24/2023       RE: Elpidio De La Fuente  [address] [facility] [place] [address]     Dear Colleague,    Thank you for referring your patient, Elpidio De La Fuente, to the [facility] E[facility]SE AND THROAT CLINIC [place] at [facility]. Please see a copy of my visit note below.      [facility]  Return Visit  Encounter date:   February 24, 2023    Chief Complaint:   Second post op    ID: Chronic sinusitis s/p sinonasal surgery as below on 1/17/23       Interval History:   Elpidio De La Fuente is a 57 year old male who presents for follow up. He has switched to Dupixent since our last appointment. He has had one dose. He has been able to taste his diet coke since starting, which he could not prior. He does not have a noticeable return in his sense of smell yet.    Sino-Nasal Outcome Test (SNOT - 22)   [facility] Operative History  Procedure Date: 01/17/2023     PREOPERATIVE DIAGNOSES:    1.  Chronic pansinusitis with nasal polyposis.  2.  Nasal obstruction.  3.  Atypical facial pain.  4.  Hyposmia.  5.  Reactive lower airway disease.     POSTOPERATIVE DIAGNOSES:  1.  Chronic pansinusitis with nasal polyposis.  2.  Nasal obstruction.  3.  Atypical facial pain.  4.  Hyposmia.  5.  Reactive lower airway disease.     PROCEDURE PERFORMED:     1.  Bilateral endoscopic frontal sinus exploration with tissue removal, total ethmoidectomy.  2.  Bilateral endoscopic sphenoidotomy with tissue removal.  3.  Bilateral endoscopic revision maxillary antrostomy with tissue removal.  4.  Bilateral endoscopic partial inferior turbinate resection.  5.  Computerized image guidance, extradural.     SURGEON:  Roman Waters MD     ASSISTANT:  Arabella Dow MD    Review of systems: A 14-point review of systems has been conducted and is negative for any notable symptoms, except as dictated in the history of present illness.     Physical Exam:  Vital signs: There were no vitals taken for this  visit.   General Appearance: No acute distress, appropriate demeanor, conversant  Eyes: moist conjunctivae; EOMI; pupils symmetric; visual acuity grossly intact; no proptosis  Head: normocephalic; overall symmetric appearance without deformity  Face: overall symmetric without deformity; HB I/VI  Nose: No external deformity; see endoscopy  Lungs: symmetric chest rise; no wheezing  CV: Good distal perfusion; normal hear rate  Extremities: No deformity  Neurologic Exam: Cranial nerves II-XII are grossly intact; no focal deficit     I then used a combination of non-cutting forceps, straight and curved suction to clear bilateral surgical cavities of packing, clot, crust, and fibrinopurulent debris.      Procedure Note  Procedure performed: Rigid nasal endoscopy with bilateral debridement  Indication: To evaluate for sinonasal pathology not visualized on routine anterior rhinoscopy  Anesthesia: 4% topical lidocaine with 0.05 % oxymetazoline  Description of procedure: A 30 degree, 3 mm rigid endoscope was inserted into bilateral nasal cavities and the nasal valves, nasal cavity, middle meatus, sphenoethmoid recess, nasopharynx were evaluated for evidence of obstruction, edema, purulence, polyps and/or mass/lesion.     I then used a combination of non-cutting forceps, straight and curved suction to clear bilateral surgical cavities of packing, clot, crust, and fibrinopurulent debris.       [place]-Luis Endoscopic Scoring System  Endoscopic observation Right Left   Polyps in middle meatus (0 = absent, 1 = restricted to middle meatus, 2 = Beyond middle meatus) 0 0   Discharge (0 = absent, 1 = thin and clear, 2 = thick, purulent) 0 0   Edema (0 = absent, 1 = mild-moderate, 2 = moderate-severe) 1 1   Crusting (0 = absent, 1 = mild-moderate, 2 = moderate-severe) 1 1   Scarring (0= absent, 1 = mild-moderate, 2 = moderate-severe) 0 0   Total 2 2     Findings  RT: Ethmoid cavity polypoid edema; all sinuses widely patent  LT:  Ethmoid polypoid edema; crust around the maxillary sinus sphenoid sinus is clear; all sinuses patent    The patient tolerated the procedure well without complication.     Laboratory Review:  n/a     Imaging Review:  I personally reviewed images from the CT sinus from November 2, 2022:  There is pansinus mucosal thickening, most severe in the bilateral frontal sinuses. There are extensive new osteogenesis of many of the sinuses, especially the sphenoid sinus.  There are extensive surgical changes, including: Near complete resection of the right middle turbinate flush with the skull base, right endoscopic medial maxillectomy changes, left inferior meatal window, left partial resection of middle turbinate, septal perforation with button in place, extensive new osteogenesis of the high nasofrontal beak and high bony nasal septum.     Pathology Review:  n/a     Assessment/Medical Decision Making/Plan:  CRSwNP with comorbid asthma  S/p sinonasal surgery 1/17/23  History of psoriatic arthritis  Hyposmia and nasal congestion secondary to CRSwNP  Atypical facial pain      Plan:  Bilateral endoscopy performed today with debridement. His sinuses continue to heal appropriately. He should continue with Dupixent, rinse as needed, Flonase, and follow-up in 2 months.     Roman Waters MD    [facility]  Rhinology, Endoscopic Skull Base Surgery  [facility]  Department of Otolaryngology - Head & Neck Surgery    Scribe Disclosure:  I, Riley Foster, am serving as a scribe to document services personally performed by Roman Waters MD at this visit, based upon the provider's statements to me. All documentation has been reviewed by the aforementioned provider prior to being entered into the official medical record.     Additional portions of the patient's history have been reviewed below.    ~~~~~~~~~~~~~~~~~~~~~~~~~~~~~~~~~~~~~~~~~~~~~~~~~~~~~~~~~~~~~~~~~~~~~~~~~~~~~~~~~~~~~~~~~~~~~~~~~~~~~~~~~~~~~~~~~~~~~~~~~~~~~~~~~~~~~~~    Past Medical History:   Diagnosis Date     Anxiety      History of kidney cancer      Sleep apnea      Uncomplicated asthma         Past Surgical History:   Procedure Laterality Date     ENDOSCOPIC ENDONASAL SURGERY Bilateral 1/17/2023    Procedure: BILATERAL REVISION ENDOSCOPIC FRONTAL SINUS EXPLORATION WITH TISSUE REMOVAL, TOTAL ETHMOIDECTOMY, MAXILLARY ANTROSTOMY WITH TISSUE REMOVAL, SPHENOIDOTOMY WITH TISSUE REMOVAL;  Surgeon: Roman Waters MD;  Location:  OR        No family history on file.     Social History     Socioeconomic History     Marital status:    Substance and Sexual Activity     Alcohol use: Not Currently     Drug use: Never           Again, thank you for allowing me to participate in the care of your patient.      Sincerely,    Roman Waters MD

## 2023-03-16 NOTE — TELEPHONE ENCOUNTER
MEDICATION APPEAL DENIED    Medication: Dupixent    Denial Date: 3/8/2023    Denial Rational:     Second Level Appeal Information:

## 2023-03-17 ENCOUNTER — TELEPHONE (OUTPATIENT)
Dept: OTOLARYNGOLOGY | Facility: CLINIC | Age: 58
End: 2023-03-17
Payer: COMMERCIAL

## 2023-03-17 RX ORDER — FLUTICASONE PROPIONATE 50 MCG
2 SPRAY, SUSPENSION (ML) NASAL DAILY
Qty: 16 G | Refills: 11 | COMMUNITY
Start: 2023-03-17 | End: 2023-03-17

## 2023-03-17 RX ORDER — FLUTICASONE PROPIONATE 50 MCG
2 SPRAY, SUSPENSION (ML) NASAL DAILY
Qty: 16 G | Refills: 11 | COMMUNITY

## 2023-03-17 NOTE — TELEPHONE ENCOUNTER
Called to find out if the pt uses Flonase nasal spray? He does use it daily- 2 sprays in each nostril. If he stops it, then he gets headaches. Notified Kenia Chavez of pt's response.  Rafaela Ku LPN

## 2023-03-17 NOTE — TELEPHONE ENCOUNTER
PA Initiation    Medication: Dupixent  Insurance Company: [employer] FEDERAL - Phone [phone] Fax [phone]  Pharmacy Filling the Rx: [facility] - [place], [place] - [address]  Filling Pharmacy Phone:    Filling Pharmacy Fax:    Start Date: 3/17/2023    BPWGACBH-sent with new information

## 2023-03-17 NOTE — TELEPHONE ENCOUNTER
"Per Marsha with prior authorization team:    \"Do we know if patient has used fluticasone? If we addended the chart notes that he has also used fluticasone, we could overturn this decision\"    Followed up with patient to determine if he has used medication, if so how much, and if he is still using medication.    Per patient, currently uses fluticasone 2 sprays in each nostril daily. Patient has no problems with medication, but does note that if he stops the fluticasone nasal spray, he develops headaches.    Signed Prescriptions:                        Disp   Refills    fluticasone (FLONASE) 50 MCG/ACT nasal spr*16 g   11       Sig: Spray 2 sprays into both nostrils daily  Authorizing Provider: MARILY BORDEN  Ordering User: HAN NUÑEZ    Updated patient record to show medication as historical. Updated prior authorization team with this information.    JOAQUIN SHORT LPN on 3/17/2023 at 12:17 PM        "

## 2023-03-21 NOTE — TELEPHONE ENCOUNTER
Prior Authorization Approval    Authorization Effective Date: 3/21/2023  Authorization Expiration Date: 7/10/2023  Medication: Dupixent  Approved Dose/Quantity: 300 mg q14d  Reference #: [id number]   Insurance Company: [employer] FEDERAL - Phone [phone] Fax [phone]  Expected CoPay:       CoPay Card Available:      Foundation Assistance Needed:    Which Pharmacy is filling the prescription (Not needed for infusion/clinic administered): [facility] - [place], [place] - [address] [place] [address]  Pharmacy Notified: Yes  Patient Notified: Yes

## 2023-04-27 NOTE — PROGRESS NOTES
"  [facility]  Return Visit  Encounter date:   April 28, 2023    Chief Complaint:   Follow-up     ID: Chronic sinusitis s/p sinonasal surgery as below on 1/17/23    Interval History:   Elpidio De La Fuente is a 57 year old male who presents for follow up. At our last visit on 2/24/23, patient reported improvement in his sense of taste, and he was healing well.    Today, he reports that he has been doing well. He has still been taking Dupixent without any issues. He has not had any headaches recently, and his sense of smell has returned.    Sino-Nasal Outcome Test (SNOT - 22)  DNT    [place] [facility]  Procedure Date: 01/17/2023     PREOPERATIVE DIAGNOSES:    1.  Chronic pansinusitis with nasal polyposis.  2.  Nasal obstruction.  3.  Atypical facial pain.  4.  Hyposmia.  5.  Reactive lower airway disease.     POSTOPERATIVE DIAGNOSES:  1.  Chronic pansinusitis with nasal polyposis.  2.  Nasal obstruction.  3.  Atypical facial pain.  4.  Hyposmia.  5.  Reactive lower airway disease.     PROCEDURE PERFORMED:     1.  Bilateral endoscopic frontal sinus exploration with tissue removal, total ethmoidectomy.  2.  Bilateral endoscopic sphenoidotomy with tissue removal.  3.  Bilateral endoscopic revision maxillary antrostomy with tissue removal.  4.  Bilateral endoscopic partial inferior turbinate resection.  5.  Computerized image guidance, extradural.     SURGEON:  Roman Waters MD     ASSISTANT:  Arabella Dow MD    Review of systems: A 14-point review of systems has been conducted and is negative for any notable symptoms, except as dictated in the history of present illness.     Physical Exam:  Vital signs: /83 (BP Location: Left arm, Patient Position: Sitting, Cuff Size: Adult Regular)   Pulse 97   Ht 1.778 m (5' 10\")   Wt 105.2 kg (232 lb)   BMI 33.29 kg/m     General Appearance: No acute distress, appropriate demeanor, conversant  Eyes: moist conjunctivae; EOMI; pupils symmetric; visual " acuity grossly intact; no proptosis  Head: normocephalic; overall symmetric appearance without deformity  Face: overall symmetric without deformity; HB I/VI  Nose: No external deformity; see endoscopy  Lungs: symmetric chest rise; no wheezing  CV: Good distal perfusion; normal hear rate  Extremities: No deformity  Neurologic Exam: Cranial nerves II-XII are grossly intact; no focal deficit    Procedure Note  Procedure performed: Rigid nasal endoscopy  Indication: To evaluate for sinonasal pathology not visualized on routine anterior rhinoscopy  Anesthesia: 4% topical lidocaine with 0.05 % oxymetazoline  Description of procedure: A 30 degree, 3 mm rigid endoscope was inserted into bilateral nasal cavities and the nasal valves, nasal cavity, middle meatus, sphenoethmoid recess, nasopharynx were evaluated for evidence of obstruction, edema, purulence, polyps and/or mass/lesion.     [place] Endoscopic Scoring System  Endoscopic observation Right Left   Polyps in middle meatus (0 = absent, 1 = restricted to middle meatus, 2 = Beyond middle meatus) 0 0   Discharge (0 = absent, 1 = thin and clear, 2 = thick, purulent) 0 0   Edema (0 = absent, 1 = mild-moderate, 2 = moderate-severe) 1 1   Crusting (0 = absent, 1 = mild-moderate, 2 = moderate-severe) 0 0   Scarring (0= absent, 1 = mild-moderate, 2 = moderate-severe) 0 0   Total 1 1     Findings  Bilateral edema of the frontal recess. No evidence of polyps.    The patient tolerated the procedure well without complication.     Laboratory Review:  NA    Imaging Review:  I personally reviewed images from the CT sinus from November 2, 2022:  There is pansinus mucosal thickening, most severe in the bilateral frontal sinuses. There are extensive new osteogenesis of many of the sinuses, especially the sphenoid sinus.  There are extensive surgical changes, including: Near complete resection of the right middle turbinate flush with the skull base, right endoscopic medial  maxillectomy changes, left inferior meatal window, left partial resection of middle turbinate, septal perforation with button in place, extensive new osteogenesis of the high nasofrontal beak and high bony nasal septum.    Pathology Review:  NA    Assessment/Medical Decision Making:  CRSwNP with comorbid asthma  S/p sinonasal surgery 1/17/23  History of psoriatic arthritis  Hyposmia and nasal congestion secondary to CRSwNP  Atypical facial pain      Plan:  Bilateral endoscopy performed today with suctioning. He is doing well today without evidence of polyps. He should continue Dupixent, and he can follow up with me in 6 months.    Roman Waters MD    [facility]  Rhinology, Endoscopic Skull Base Surgery  [facility]  Department of Otolaryngology - Head & Neck Surgery    Scribe Disclosure:  I, Sunil Springer, am serving as a scribe to document services personally performed by Roman Waters MD at this visit, based upon the provider's statements to me. All documentation has been reviewed by the aforementioned provider prior to being entered into the official medical record.     Additional portions of the patient's history have been reviewed below.   ~~~~~~~~~~~~~~~~~~~~~~~~~~~~~~~~~~~~~~~~~~~~~~~~~~~~~~~~~~~~~~~~~~~~~~~~~~~~~~~~~~~~~~~~~~~~~~~~~~~~~~~~~~~~~~~~~~~~~~~~~~~~~~~~~~~~~~~    Past Medical History:   Diagnosis Date     Anxiety      History of kidney cancer      Sleep apnea      Uncomplicated asthma         Past Surgical History:   Procedure Laterality Date     ENDOSCOPIC ENDONASAL SURGERY Bilateral 1/17/2023    Procedure: BILATERAL REVISION ENDOSCOPIC FRONTAL SINUS EXPLORATION WITH TISSUE REMOVAL, TOTAL ETHMOIDECTOMY, MAXILLARY ANTROSTOMY WITH TISSUE REMOVAL, SPHENOIDOTOMY WITH TISSUE REMOVAL;  Surgeon: Roman Waters MD;  Location:  OR        No family history on file.     Social History     Socioeconomic History     Marital status:     Substance and Sexual Activity     Alcohol use: Not Currently     Drug use: Never

## 2023-04-28 ENCOUNTER — OFFICE VISIT (OUTPATIENT)
Dept: OTOLARYNGOLOGY | Facility: CLINIC | Age: 58
End: 2023-04-28
Payer: COMMERCIAL

## 2023-04-28 VITALS
HEIGHT: 70 IN | DIASTOLIC BLOOD PRESSURE: 83 MMHG | BODY MASS INDEX: 33.21 KG/M2 | SYSTOLIC BLOOD PRESSURE: 134 MMHG | WEIGHT: 232 LBS | HEART RATE: 97 BPM

## 2023-04-28 DIAGNOSIS — J32.4 CHRONIC PANSINUSITIS: Primary | ICD-10-CM

## 2023-04-28 DIAGNOSIS — J33.9 NASAL POLYPOSIS: ICD-10-CM

## 2023-04-28 DIAGNOSIS — A49.01 STAPH AUREUS INFECTION: ICD-10-CM

## 2023-04-28 PROCEDURE — 31231 NASAL ENDOSCOPY DX: CPT | Performed by: OTOLARYNGOLOGY

## 2023-04-28 PROCEDURE — 99212 OFFICE O/P EST SF 10 MIN: CPT | Mod: 25 | Performed by: OTOLARYNGOLOGY

## 2023-04-28 ASSESSMENT — PAIN SCALES - GENERAL: PAINLEVEL: NO PAIN (0)

## 2023-04-28 NOTE — PATIENT INSTRUCTIONS
"You were seen in the clinic today by Dr. Dean. If you have any questions or concerns after your appointment, please call the clinic at [phone]. Press \"1\" for scheduling, press \"3\" for nurse advice.    2.   The following has been recommended for you based upon your appointment today:   -Continue your Dupixent injections.    3.   Plan to return to the clinic in 6 months for follow-up.    Karina BUTLER, RN  [facility]  Department of Otolaryngology  [phone]      "

## 2023-06-28 ENCOUNTER — TELEPHONE (OUTPATIENT)
Dept: OTOLARYNGOLOGY | Facility: CLINIC | Age: 58
End: 2023-06-28
Payer: COMMERCIAL

## 2023-06-28 NOTE — TELEPHONE ENCOUNTER
PA Initiation    Medication: DUPIXENT 300 MG/2ML SC SOPN  Insurance Company: [employer] FED[employer]AL - Phone [phone] Fax [phone]  Pharmacy Filling the Rx: [facility] - [place], [place] - [facility] [address]  Filling Pharmacy Phone:    Filling Pharmacy Fax:    Start Date: 6/28/2023    [id number]

## 2023-06-30 NOTE — TELEPHONE ENCOUNTER
Prior Authorization Approval    Medication: DUPIXENT 300 MG/2ML SC SOPN  Authorization Effective Date: 6/30/2023  Authorization Expiration Date: 6/28/2024  Approved Dose/Quantity: q14d  Reference #: [id number]   Insurance Company: [employer] - Phone [phone] Fax [phone]  Expected CoPay:  unknown     CoPay Card Available:    yes  Financial Assistance Needed: no needs expressed from pt  Which Pharmacy is filling the prescription: [facility] - [place], [place] - [address] [place] [address]  Pharmacy Notified: Yes  Patient Notified: Yes

## 2023-07-06 ENCOUNTER — TELEPHONE (OUTPATIENT)
Dept: OTOLARYNGOLOGY | Facility: CLINIC | Age: 58
End: 2023-07-06
Payer: COMMERCIAL

## 2023-07-10 ENCOUNTER — TELEPHONE (OUTPATIENT)
Dept: OTOLARYNGOLOGY | Facility: CLINIC | Age: 58
End: 2023-07-10
Payer: COMMERCIAL

## 2023-07-10 NOTE — TELEPHONE ENCOUNTER
LVM for patient regarding rescheduling appt. Reached max attempts to contact patient. Rescheduled with Dr. Avalos due to Dr. Waters no longer with [facility]. Left my direct line and call center number for scheduling needs. Also sent cancel/rescheduled letter to address in chart.

## 2023-09-25 ENCOUNTER — TELEPHONE (OUTPATIENT)
Dept: OTOLARYNGOLOGY | Facility: CLINIC | Age: 58
End: 2023-09-25
Payer: COMMERCIAL

## 2023-09-25 NOTE — TELEPHONE ENCOUNTER
LVM to reschedule from Dr. Avalos to Dr Acuña as Dr. Avalos is no longer available. Can be same visit type. Gave call center number

## 2024-03-10 ENCOUNTER — HEALTH MAINTENANCE LETTER (OUTPATIENT)
Age: 59
End: 2024-03-10

## 2025-03-16 ENCOUNTER — HEALTH MAINTENANCE LETTER (OUTPATIENT)
Age: 60
End: 2025-03-16

## (undated) DEVICE — BLADE SHAVER SERRATED 4MM ROTATE 1884002HRE

## (undated) DEVICE — DRSG NASOPORE FRAG FIRM 8CM 5400-020-008

## (undated) DEVICE — SPONGE COTTONOID 1/2X3" 80-1407

## (undated) DEVICE — BLADE SINUS RAD12 CVD 4MM FUSION ROTATE W/TRACKING

## (undated) DEVICE — LINEN TOWEL PACK X5 5464

## (undated) DEVICE — ESU ELEC NDL 6" COATED/INSULATED E1465-6

## (undated) DEVICE — PACK NEURO MINOR UMMC SNE32MNMU4

## (undated) DEVICE — BLADE SHAVER SINUS 3.5MM Q RAD 90DEG CVD 1883519HR

## (undated) DEVICE — DRAPE POUCH INSTRUMENT 1018

## (undated) DEVICE — SPONGE SURGIFOAM 01GM POWDER 1978

## (undated) DEVICE — DECANTER VIAL 2006S

## (undated) DEVICE — DRAPE CORETEMP FLUID WARM SYSTEM 62X56IN CTD200

## (undated) DEVICE — ENDO SHEATH STORZ SHARPSITE ENDOSCRUB 30DEG 4MM 1912010

## (undated) DEVICE — LINEN TOWEL PACK X6 WHITE 5487

## (undated) DEVICE — ESU SUCTION CAUTERY 10FR FOOT CONTROL E2505-10FR

## (undated) DEVICE — TRACKER ENT OTS INSTRUMENT FUSION 9733533

## (undated) DEVICE — SPECIMEN TRAP MUCOUS 40ML LUKI C30200A

## (undated) DEVICE — ESU GROUND PAD ADULT W/CORD E7507

## (undated) DEVICE — DRAPE FLUID WARMING 52"X66" ORS-301

## (undated) DEVICE — SURGICEL HEMOSTAT 4X8" 1952

## (undated) DEVICE — EYE FLUORESCEIN OPHTHALMIC STRIP FLO-GLO 1272111

## (undated) DEVICE — TUBING SUCTION 10'X3/16" N510

## (undated) DEVICE — TRACKER PATIENT NON-INVASIVE AXIEM 9734887

## (undated) DEVICE — ENDO SHEATH STORZ SHARPSITE ENDOSCRUB 0DEG 4MM 1912000

## (undated) RX ORDER — DEXAMETHASONE SODIUM PHOSPHATE 4 MG/ML
INJECTION, SOLUTION INTRA-ARTICULAR; INTRALESIONAL; INTRAMUSCULAR; INTRAVENOUS; SOFT TISSUE
Status: DISPENSED
Start: 2023-01-17

## (undated) RX ORDER — OXYMETAZOLINE HYDROCHLORIDE 0.05 G/100ML
SPRAY NASAL
Status: DISPENSED
Start: 2023-01-17

## (undated) RX ORDER — PROPOFOL 10 MG/ML
INJECTION, EMULSION INTRAVENOUS
Status: DISPENSED
Start: 2023-01-17

## (undated) RX ORDER — EPINEPHRINE NASAL SOLUTION 1 MG/ML
SOLUTION NASAL
Status: DISPENSED
Start: 2023-01-17

## (undated) RX ORDER — ONDANSETRON 2 MG/ML
INJECTION INTRAMUSCULAR; INTRAVENOUS
Status: DISPENSED
Start: 2023-01-17

## (undated) RX ORDER — LABETALOL HYDROCHLORIDE 5 MG/ML
INJECTION, SOLUTION INTRAVENOUS
Status: DISPENSED
Start: 2023-01-17

## (undated) RX ORDER — ACETAMINOPHEN 325 MG/1
TABLET ORAL
Status: DISPENSED
Start: 2023-01-17

## (undated) RX ORDER — FENTANYL CITRATE 50 UG/ML
INJECTION, SOLUTION INTRAMUSCULAR; INTRAVENOUS
Status: DISPENSED
Start: 2023-01-17

## (undated) RX ORDER — LIDOCAINE HYDROCHLORIDE AND EPINEPHRINE 10; 10 MG/ML; UG/ML
INJECTION, SOLUTION INFILTRATION; PERINEURAL
Status: DISPENSED
Start: 2023-01-17

## (undated) RX ORDER — HYDROMORPHONE HYDROCHLORIDE 1 MG/ML
INJECTION, SOLUTION INTRAMUSCULAR; INTRAVENOUS; SUBCUTANEOUS
Status: DISPENSED
Start: 2023-01-17